# Patient Record
Sex: FEMALE | Race: OTHER | NOT HISPANIC OR LATINO | Employment: FULL TIME | URBAN - METROPOLITAN AREA
[De-identification: names, ages, dates, MRNs, and addresses within clinical notes are randomized per-mention and may not be internally consistent; named-entity substitution may affect disease eponyms.]

---

## 2023-08-09 ENCOUNTER — APPOINTMENT (OUTPATIENT)
Dept: RADIOLOGY | Facility: MEDICAL CENTER | Age: 53
End: 2023-08-09
Attending: EMERGENCY MEDICINE
Payer: MEDICAID

## 2023-08-09 ENCOUNTER — HOSPITAL ENCOUNTER (EMERGENCY)
Facility: MEDICAL CENTER | Age: 53
End: 2023-08-09
Attending: EMERGENCY MEDICINE
Payer: MEDICAID

## 2023-08-09 ENCOUNTER — APPOINTMENT (OUTPATIENT)
Dept: RADIOLOGY | Facility: MEDICAL CENTER | Age: 53
End: 2023-08-09
Payer: MEDICAID

## 2023-08-09 VITALS
BODY MASS INDEX: 20.57 KG/M2 | DIASTOLIC BLOOD PRESSURE: 75 MMHG | SYSTOLIC BLOOD PRESSURE: 131 MMHG | WEIGHT: 128 LBS | OXYGEN SATURATION: 97 % | HEART RATE: 57 BPM | HEIGHT: 66 IN | RESPIRATION RATE: 16 BRPM | TEMPERATURE: 97.4 F

## 2023-08-09 DIAGNOSIS — J44.1 ACUTE EXACERBATION OF CHRONIC OBSTRUCTIVE PULMONARY DISEASE (COPD) (HCC): ICD-10-CM
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
## 2023-08-09 LAB
ALBUMIN SERPL BCP-MCNC: 4.1 G/DL (ref 3.2–4.9)
ALBUMIN/GLOB SERPL: 1.4 G/DL
ALP SERPL-CCNC: 101 U/L (ref 30–99)
ALT SERPL-CCNC: 22 U/L (ref 2–50)
ANION GAP SERPL CALC-SCNC: 11 MMOL/L (ref 7–16)
AST SERPL-CCNC: 22 U/L (ref 12–45)
BILIRUB SERPL-MCNC: 0.2 MG/DL (ref 0.1–1.5)
BUN SERPL-MCNC: 19 MG/DL (ref 8–22)
CALCIUM ALBUM COR SERPL-MCNC: 8.6 MG/DL (ref 8.5–10.5)
CALCIUM SERPL-MCNC: 8.7 MG/DL (ref 8.5–10.5)
CHLORIDE SERPL-SCNC: 106 MMOL/L (ref 96–112)
CO2 SERPL-SCNC: 21 MMOL/L (ref 20–33)
CREAT SERPL-MCNC: 0.75 MG/DL (ref 0.5–1.4)
ERYTHROCYTE [DISTWIDTH] IN BLOOD BY AUTOMATED COUNT: 47.6 FL (ref 35.9–50)
ETHANOL BLD-MCNC: <10.1 MG/DL
GFR SERPLBLD CREATININE-BSD FMLA CKD-EPI: 96 ML/MIN/1.73 M 2
GLOBULIN SER CALC-MCNC: 3 G/DL (ref 1.9–3.5)
GLUCOSE SERPL-MCNC: 97 MG/DL (ref 65–99)
HCG SERPL QL: NEGATIVE
HCT VFR BLD AUTO: 42.1 % (ref 37–47)
HGB BLD-MCNC: 13.9 G/DL (ref 12–16)
MCH RBC QN AUTO: 29.8 PG (ref 27–33)
MCHC RBC AUTO-ENTMCNC: 33 G/DL (ref 32.2–35.5)
MCV RBC AUTO: 90.1 FL (ref 81.4–97.8)
PLATELET # BLD AUTO: 256 K/UL (ref 164–446)
PMV BLD AUTO: 10.3 FL (ref 9–12.9)
POTASSIUM SERPL-SCNC: 4.2 MMOL/L (ref 3.6–5.5)
PROT SERPL-MCNC: 7.1 G/DL (ref 6–8.2)
RBC # BLD AUTO: 4.67 M/UL (ref 4.2–5.4)
SODIUM SERPL-SCNC: 138 MMOL/L (ref 135–145)
WBC # BLD AUTO: 6.8 K/UL (ref 4.8–10.8)

## 2023-08-09 PROCEDURE — 72128 CT CHEST SPINE W/O DYE: CPT

## 2023-08-09 PROCEDURE — A9270 NON-COVERED ITEM OR SERVICE: HCPCS | Performed by: EMERGENCY MEDICINE

## 2023-08-09 PROCEDURE — 80053 COMPREHEN METABOLIC PANEL: CPT

## 2023-08-09 PROCEDURE — 84703 CHORIONIC GONADOTROPIN ASSAY: CPT

## 2023-08-09 PROCEDURE — 82077 ASSAY SPEC XCP UR&BREATH IA: CPT

## 2023-08-09 PROCEDURE — 700117 HCHG RX CONTRAST REV CODE 255: Performed by: EMERGENCY MEDICINE

## 2023-08-09 PROCEDURE — 72131 CT LUMBAR SPINE W/O DYE: CPT

## 2023-08-09 PROCEDURE — 700101 HCHG RX REV CODE 250: Performed by: EMERGENCY MEDICINE

## 2023-08-09 PROCEDURE — 94640 AIRWAY INHALATION TREATMENT: CPT

## 2023-08-09 PROCEDURE — 85027 COMPLETE CBC AUTOMATED: CPT

## 2023-08-09 PROCEDURE — 99285 EMERGENCY DEPT VISIT HI MDM: CPT

## 2023-08-09 PROCEDURE — 71260 CT THORAX DX C+: CPT

## 2023-08-09 PROCEDURE — 700102 HCHG RX REV CODE 250 W/ 637 OVERRIDE(OP): Performed by: EMERGENCY MEDICINE

## 2023-08-09 PROCEDURE — 305948 HCHG GREEN TRAUMA ACT PRE-NOTIFY NO CC

## 2023-08-09 PROCEDURE — 72170 X-RAY EXAM OF PELVIS: CPT

## 2023-08-09 PROCEDURE — 700111 HCHG RX REV CODE 636 W/ 250 OVERRIDE (IP): Performed by: EMERGENCY MEDICINE

## 2023-08-09 PROCEDURE — 36415 COLL VENOUS BLD VENIPUNCTURE: CPT

## 2023-08-09 RX ORDER — PREDNISONE 20 MG/1
20 TABLET ORAL DAILY
Qty: 4 TABLET | Refills: 0 | Status: SHIPPED | OUTPATIENT
Start: 2023-08-09 | End: 2023-08-13

## 2023-08-09 RX ORDER — PREDNISONE 20 MG/1
20 TABLET ORAL DAILY
Qty: 4 TABLET | Refills: 0 | Status: SHIPPED | OUTPATIENT
Start: 2023-08-09 | End: 2023-08-09 | Stop reason: SDUPTHER

## 2023-08-09 RX ORDER — IPRATROPIUM BROMIDE AND ALBUTEROL SULFATE 2.5; .5 MG/3ML; MG/3ML
3 SOLUTION RESPIRATORY (INHALATION) ONCE
Status: COMPLETED | OUTPATIENT
Start: 2023-08-09 | End: 2023-08-09

## 2023-08-09 RX ORDER — PREDNISONE 20 MG/1
60 TABLET ORAL ONCE
Status: COMPLETED | OUTPATIENT
Start: 2023-08-09 | End: 2023-08-09

## 2023-08-09 RX ORDER — OXYCODONE HYDROCHLORIDE AND ACETAMINOPHEN 5; 325 MG/1; MG/1
1 TABLET ORAL ONCE
Status: COMPLETED | OUTPATIENT
Start: 2023-08-09 | End: 2023-08-09

## 2023-08-09 RX ADMIN — IPRATROPIUM BROMIDE AND ALBUTEROL SULFATE 3 ML: 2.5; .5 SOLUTION RESPIRATORY (INHALATION) at 10:00

## 2023-08-09 RX ADMIN — OXYCODONE AND ACETAMINOPHEN 1 TABLET: 5; 325 TABLET ORAL at 08:14

## 2023-08-09 RX ADMIN — IOHEXOL 100 ML: 350 INJECTION, SOLUTION INTRAVENOUS at 08:09

## 2023-08-09 RX ADMIN — PREDNISONE 60 MG: 20 TABLET ORAL at 09:55

## 2023-08-09 NOTE — DISCHARGE PLANNING
ER  Note:  Received call from pt ([phone]) requesting for a work note. Informed pt that work note will be at the ED lobby. Pt also reports she did not get her printed prescription for prednisone. RN CM spoke to ERP. Prednisone ordered by Dr. Fabian. Work note and prescription left at ED lobby for pt to . ED tech informed. RN CM called back pt and provided above update.
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
## 2024-12-20 ENCOUNTER — APPOINTMENT (OUTPATIENT)
Dept: RADIOLOGY | Facility: MEDICAL CENTER | Age: 54
End: 2024-12-20
Attending: EMERGENCY MEDICINE
Payer: MEDICAID

## 2024-12-20 ENCOUNTER — PHARMACY VISIT (OUTPATIENT)
Dept: PHARMACY | Facility: MEDICAL CENTER | Age: 54
End: 2024-12-20
Payer: COMMERCIAL

## 2024-12-20 ENCOUNTER — HOSPITAL ENCOUNTER (EMERGENCY)
Facility: MEDICAL CENTER | Age: 54
End: 2024-12-20
Attending: EMERGENCY MEDICINE
Payer: MEDICAID

## 2024-12-20 VITALS
RESPIRATION RATE: 18 BRPM | OXYGEN SATURATION: 95 % | WEIGHT: 128.09 LBS | TEMPERATURE: 98.4 F | BODY MASS INDEX: 20.59 KG/M2 | HEIGHT: 66 IN | DIASTOLIC BLOOD PRESSURE: 67 MMHG | SYSTOLIC BLOOD PRESSURE: 112 MMHG | HEART RATE: 92 BPM

## 2024-12-20 DIAGNOSIS — J44.1 COPD EXACERBATION (HCC): ICD-10-CM

## 2024-12-20 LAB
ALBUMIN SERPL BCP-MCNC: 3.8 G/DL (ref 3.2–4.9)
ALBUMIN/GLOB SERPL: 1.2 G/DL
ALP SERPL-CCNC: 96 U/L (ref 30–99)
ALT SERPL-CCNC: 13 U/L (ref 2–50)
ANION GAP SERPL CALC-SCNC: 12 MMOL/L (ref 7–16)
AST SERPL-CCNC: 13 U/L (ref 12–45)
BASOPHILS # BLD AUTO: 1.1 % (ref 0–1.8)
BASOPHILS # BLD: 0.07 K/UL (ref 0–0.12)
BILIRUB SERPL-MCNC: 0.2 MG/DL (ref 0.1–1.5)
BUN SERPL-MCNC: 14 MG/DL (ref 8–22)
CALCIUM ALBUM COR SERPL-MCNC: 8.7 MG/DL (ref 8.5–10.5)
CALCIUM SERPL-MCNC: 8.5 MG/DL (ref 8.5–10.5)
CHLORIDE SERPL-SCNC: 103 MMOL/L (ref 96–112)
CO2 SERPL-SCNC: 24 MMOL/L (ref 20–33)
CREAT SERPL-MCNC: 0.64 MG/DL (ref 0.5–1.4)
EKG IMPRESSION: NORMAL
EOSINOPHIL # BLD AUTO: 0.64 K/UL (ref 0–0.51)
EOSINOPHIL NFR BLD: 9.9 % (ref 0–6.9)
ERYTHROCYTE [DISTWIDTH] IN BLOOD BY AUTOMATED COUNT: 49.3 FL (ref 35.9–50)
GFR SERPLBLD CREATININE-BSD FMLA CKD-EPI: 105 ML/MIN/1.73 M 2
GLOBULIN SER CALC-MCNC: 3.1 G/DL (ref 1.9–3.5)
GLUCOSE SERPL-MCNC: 98 MG/DL (ref 65–99)
HCT VFR BLD AUTO: 42.8 % (ref 37–47)
HGB BLD-MCNC: 13.7 G/DL (ref 12–16)
IMM GRANULOCYTES # BLD AUTO: 0.01 K/UL (ref 0–0.11)
IMM GRANULOCYTES NFR BLD AUTO: 0.2 % (ref 0–0.9)
LYMPHOCYTES # BLD AUTO: 1.62 K/UL (ref 1–4.8)
LYMPHOCYTES NFR BLD: 25.2 % (ref 22–41)
MCH RBC QN AUTO: 29.1 PG (ref 27–33)
MCHC RBC AUTO-ENTMCNC: 32 G/DL (ref 32.2–35.5)
MCV RBC AUTO: 90.9 FL (ref 81.4–97.8)
MONOCYTES # BLD AUTO: 0.48 K/UL (ref 0–0.85)
MONOCYTES NFR BLD AUTO: 7.5 % (ref 0–13.4)
NEUTROPHILS # BLD AUTO: 3.62 K/UL (ref 1.82–7.42)
NEUTROPHILS NFR BLD: 56.1 % (ref 44–72)
NRBC # BLD AUTO: 0 K/UL
NRBC BLD-RTO: 0 /100 WBC (ref 0–0.2)
NT-PROBNP SERPL IA-MCNC: 240 PG/ML (ref 0–125)
PLATELET # BLD AUTO: 334 K/UL (ref 164–446)
PMV BLD AUTO: 10.2 FL (ref 9–12.9)
POTASSIUM SERPL-SCNC: 3.7 MMOL/L (ref 3.6–5.5)
PROT SERPL-MCNC: 6.9 G/DL (ref 6–8.2)
RBC # BLD AUTO: 4.71 M/UL (ref 4.2–5.4)
SODIUM SERPL-SCNC: 139 MMOL/L (ref 135–145)
TROPONIN T SERPL-MCNC: <6 NG/L (ref 6–19)
WBC # BLD AUTO: 6.4 K/UL (ref 4.8–10.8)

## 2024-12-20 PROCEDURE — 83880 ASSAY OF NATRIURETIC PEPTIDE: CPT

## 2024-12-20 PROCEDURE — 71045 X-RAY EXAM CHEST 1 VIEW: CPT

## 2024-12-20 PROCEDURE — 36415 COLL VENOUS BLD VENIPUNCTURE: CPT

## 2024-12-20 PROCEDURE — 700101 HCHG RX REV CODE 250: Mod: UD | Performed by: EMERGENCY MEDICINE

## 2024-12-20 PROCEDURE — 93005 ELECTROCARDIOGRAM TRACING: CPT | Mod: TC

## 2024-12-20 PROCEDURE — 84484 ASSAY OF TROPONIN QUANT: CPT

## 2024-12-20 PROCEDURE — 80053 COMPREHEN METABOLIC PANEL: CPT

## 2024-12-20 PROCEDURE — 85025 COMPLETE CBC W/AUTO DIFF WBC: CPT

## 2024-12-20 PROCEDURE — 700111 HCHG RX REV CODE 636 W/ 250 OVERRIDE (IP): Mod: JZ,UD | Performed by: EMERGENCY MEDICINE

## 2024-12-20 PROCEDURE — 94640 AIRWAY INHALATION TREATMENT: CPT

## 2024-12-20 PROCEDURE — 93005 ELECTROCARDIOGRAM TRACING: CPT | Mod: TC | Performed by: EMERGENCY MEDICINE

## 2024-12-20 PROCEDURE — 99285 EMERGENCY DEPT VISIT HI MDM: CPT

## 2024-12-20 PROCEDURE — 96374 THER/PROPH/DIAG INJ IV PUSH: CPT

## 2024-12-20 RX ORDER — METHYLPREDNISOLONE SODIUM SUCCINATE 125 MG/2ML
62.5 INJECTION, POWDER, LYOPHILIZED, FOR SOLUTION INTRAMUSCULAR; INTRAVENOUS ONCE
Status: COMPLETED | OUTPATIENT
Start: 2024-12-20 | End: 2024-12-20

## 2024-12-20 RX ORDER — ALBUTEROL SULFATE 0.83 MG/ML
2.5 SOLUTION RESPIRATORY (INHALATION) EVERY 4 HOURS PRN
Qty: 75 ML | Refills: 0 | Status: SHIPPED | OUTPATIENT
Start: 2024-12-20

## 2024-12-20 RX ORDER — ALBUTEROL SULFATE 90 UG/1
2 INHALANT RESPIRATORY (INHALATION) EVERY 4 HOURS PRN
Qty: 8.5 G | Refills: 0 | Status: SHIPPED | OUTPATIENT
Start: 2024-12-20

## 2024-12-20 RX ORDER — IPRATROPIUM BROMIDE AND ALBUTEROL SULFATE 2.5; .5 MG/3ML; MG/3ML
3 SOLUTION RESPIRATORY (INHALATION)
Status: DISCONTINUED | OUTPATIENT
Start: 2024-12-20 | End: 2024-12-20 | Stop reason: HOSPADM

## 2024-12-20 RX ORDER — PREDNISONE 20 MG/1
40 TABLET ORAL DAILY
Qty: 10 TABLET | Refills: 0 | Status: SHIPPED | OUTPATIENT
Start: 2024-12-20 | End: 2024-12-25

## 2024-12-20 RX ADMIN — IPRATROPIUM BROMIDE AND ALBUTEROL SULFATE 3 ML: .5; 2.5 SOLUTION RESPIRATORY (INHALATION) at 09:47

## 2024-12-20 RX ADMIN — IPRATROPIUM BROMIDE AND ALBUTEROL SULFATE 3 ML: .5; 2.5 SOLUTION RESPIRATORY (INHALATION) at 06:48

## 2024-12-20 RX ADMIN — METHYLPREDNISOLONE SODIUM SUCCINATE 62.5 MG: 125 INJECTION, POWDER, FOR SOLUTION INTRAMUSCULAR; INTRAVENOUS at 05:32

## 2024-12-20 ASSESSMENT — FIBROSIS 4 INDEX: FIB4 SCORE: 0.97
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
## 2024-12-20 NOTE — ED TRIAGE NOTES
"54 y.o. female [facility]  Chief Complaint   Patient presents with    Shortness of Breath     Patient BIB EMS from home to [facility] presented to ED with complaints of SOB, patient endorses she was in bed whole day because she was not feeling well, last night her SOB got worse, pt wears PRN o2 as baseline, As per EMS Patient had wheezes and was in moderate distress on scene, on which they nebulized patient.      Patient AAO X4 , Respirations even, with mild work of breathing, patient is in tripod position, patient able to speak full sentences, hypoxic on room air with spo2 87% placed on 2 liters O2, afebrile at this time.     Medication en route   Albuterol x 2  Duo neb x 1    ED RN protocol initiated for SOB       /65   Pulse 76   Resp 18   Ht 1.676 m (5' 5.98\")   Wt 58.1 kg (128 lb 1.4 oz)   LMP  (LMP Unknown)   SpO2 (!) 87%   BMI 20.68 kg/m²     Allergies   Allergen Reactions    Naproxen Anaphylaxis    Naproxen     Pcn [Penicillins]      Past Medical History:   Diagnosis Date    COPD (chronic obstructive pulmonary disease) (HCC)      Past Surgical History:   Procedure Laterality Date    TONSILLECTOMY AND ADENOIDECTOMY       Pt made aware of triage process, placed back into lobby, educated pt to tell staff of any worsening of symptoms       "

## 2024-12-20 NOTE — ED NOTES
Pt noted to desat to 86% while sitting in gurney, awake and alert.  Pt placed on 2L NC. Pt states she has a concentrator at home but is out of portable O2 tanks.  ERP and SW updated.  SW brought to bedside to speak with pt. Pt unsure what the name of her oxygen company is.  SW offered to called oxygen companies to figure out which one covers pt's oxygen. Pt declined but is agreeable to go home with medical transport and states she will call her oxygen company when she gets home to order more portable tanks.

## 2024-12-20 NOTE — ED NOTES
Bedside report received from off going RN/tech: Wendy, assumed care of patient.  POC discussed with patient. Call light within reach, all needs addressed at this time.       Fall risk interventions in place: Keep floor surfaces clean and dry (all applicable per [place] Fall risk assessment)   Continuous monitoring: Pulse Ox or Blood Pressure  IVF/IV medications: Not Applicable   Oxygen: How many liters 2L  Bedside sitter: Not Applicable   Isolation: Not Applicable

## 2024-12-20 NOTE — DISCHARGE PLANNING
MSW met with pt to discuss oxygen tanks. Pt stated she has a working concentrator at home but does not know the company who services her tanks and concentrator. Pt stated she is ok going home with medical transport and then calling the company when she gets home for additional tanks. MSW called Roya at [facility] for transport. Roya stated [facility] cannot accommodate ride till Monday. MSW arranged [facility] transport home with 2L O2 home for pt. . Cost $111. MSW updated bedside RN and ERP.

## 2024-12-20 NOTE — ED NOTES
Patient bedside report given to RN Alina . Pt AAO X 4 , respirations even and unlabored, on 2 liters O2 via nasal canula . Call light in reach, Fall risk interventions in place.

## 2024-12-20 NOTE — ED NOTES
Sister called requesting for patient plan of care, verbal consent obtained from patient, updated on plan of care,

## 2024-12-20 NOTE — ED NOTES
Bedside report received from off going RN/tech: Wendy, assumed care of patient.  POC discussed with patient. Call light within reach, all needs addressed at this time.       Fall risk interventions in place: Keep floor surfaces clean and dry (all applicable per [place] Fall risk assessment)   Continuous monitoring: Pulse Ox or Blood Pressure  IVF/IV medications: Not Applicable   Oxygen: How many liters 2L  Bedside sitter: Not Applicable   Isolation: Not Applicable

## 2024-12-20 NOTE — ED NOTES
Patient's daughter called [facility] [phone], requesting to connect the call with patient.   Patient provided with hospital phone and connected to sister Hunter

## 2024-12-20 NOTE — ED NOTES
Sister called and requesting update on patient conditioned, updated after patient's verbal consent.

## 2025-02-15 ENCOUNTER — APPOINTMENT (OUTPATIENT)
Dept: RADIOLOGY | Facility: MEDICAL CENTER | Age: 55
DRG: 190 | End: 2025-02-15
Attending: STUDENT IN AN ORGANIZED HEALTH CARE EDUCATION/TRAINING PROGRAM
Payer: MEDICAID

## 2025-02-15 ENCOUNTER — HOSPITAL ENCOUNTER (INPATIENT)
Facility: MEDICAL CENTER | Age: 55
LOS: 3 days | DRG: 190 | End: 2025-02-18
Attending: STUDENT IN AN ORGANIZED HEALTH CARE EDUCATION/TRAINING PROGRAM | Admitting: STUDENT IN AN ORGANIZED HEALTH CARE EDUCATION/TRAINING PROGRAM
Payer: MEDICAID

## 2025-02-15 DIAGNOSIS — J44.1 ACUTE EXACERBATION OF CHRONIC OBSTRUCTIVE PULMONARY DISEASE (COPD) (HCC): ICD-10-CM

## 2025-02-15 DIAGNOSIS — J96.01 ACUTE HYPOXIC RESPIRATORY FAILURE (HCC): ICD-10-CM

## 2025-02-15 PROBLEM — J20.9 ACUTE BRONCHITIS WITH CHRONIC OBSTRUCTIVE PULMONARY DISEASE (COPD) (HCC): Status: ACTIVE | Noted: 2025-02-15

## 2025-02-15 PROBLEM — J44.0 ACUTE BRONCHITIS WITH CHRONIC OBSTRUCTIVE PULMONARY DISEASE (COPD) (HCC): Status: ACTIVE | Noted: 2025-02-15

## 2025-02-15 LAB
ALBUMIN SERPL BCP-MCNC: 4.2 G/DL (ref 3.2–4.9)
ALBUMIN/GLOB SERPL: 1.2 G/DL
ALP SERPL-CCNC: 111 U/L (ref 30–99)
ALT SERPL-CCNC: 18 U/L (ref 2–50)
ANION GAP SERPL CALC-SCNC: 12 MMOL/L (ref 7–16)
APPEARANCE UR: CLEAR
AST SERPL-CCNC: 18 U/L (ref 12–45)
BACTERIA #/AREA URNS HPF: ABNORMAL /HPF
BASOPHILS # BLD AUTO: 1.5 % (ref 0–1.8)
BASOPHILS # BLD: 0.1 K/UL (ref 0–0.12)
BILIRUB SERPL-MCNC: 0.2 MG/DL (ref 0.1–1.5)
BILIRUB UR QL STRIP.AUTO: NEGATIVE
BUN SERPL-MCNC: 16 MG/DL (ref 8–22)
CALCIUM ALBUM COR SERPL-MCNC: 9.3 MG/DL (ref 8.5–10.5)
CALCIUM SERPL-MCNC: 9.5 MG/DL (ref 8.5–10.5)
CASTS URNS QL MICRO: ABNORMAL /LPF (ref 0–2)
CHLORIDE SERPL-SCNC: 107 MMOL/L (ref 96–112)
CO2 SERPL-SCNC: 22 MMOL/L (ref 20–33)
COLOR UR: YELLOW
CORTIS SERPL-MCNC: 22.1 UG/DL (ref 0–23)
CREAT SERPL-MCNC: 0.84 MG/DL (ref 0.5–1.4)
CRP SERPL HS-MCNC: <0.3 MG/DL (ref 0–0.75)
EKG IMPRESSION: NORMAL
EOSINOPHIL # BLD AUTO: 0.73 K/UL (ref 0–0.51)
EOSINOPHIL NFR BLD: 11.1 % (ref 0–6.9)
EPITHELIAL CELLS 1715: ABNORMAL /HPF (ref 0–5)
ERYTHROCYTE [DISTWIDTH] IN BLOOD BY AUTOMATED COUNT: 48.2 FL (ref 35.9–50)
FLUAV RNA SPEC QL NAA+PROBE: NEGATIVE
FLUBV RNA SPEC QL NAA+PROBE: NEGATIVE
GFR SERPLBLD CREATININE-BSD FMLA CKD-EPI: 83 ML/MIN/1.73 M 2
GLOBULIN SER CALC-MCNC: 3.6 G/DL (ref 1.9–3.5)
GLUCOSE SERPL-MCNC: 83 MG/DL (ref 65–99)
GLUCOSE UR STRIP.AUTO-MCNC: NEGATIVE MG/DL
HCT VFR BLD AUTO: 48.9 % (ref 37–47)
HGB BLD-MCNC: 16.1 G/DL (ref 12–16)
IMM GRANULOCYTES # BLD AUTO: 0.01 K/UL (ref 0–0.11)
IMM GRANULOCYTES NFR BLD AUTO: 0.2 % (ref 0–0.9)
KETONES UR STRIP.AUTO-MCNC: NEGATIVE MG/DL
LACTATE SERPL-SCNC: 2 MMOL/L (ref 0.5–2)
LEUKOCYTE ESTERASE UR QL STRIP.AUTO: ABNORMAL
LYMPHOCYTES # BLD AUTO: 2.88 K/UL (ref 1–4.8)
LYMPHOCYTES NFR BLD: 43.8 % (ref 22–41)
MCH RBC QN AUTO: 29.4 PG (ref 27–33)
MCHC RBC AUTO-ENTMCNC: 32.9 G/DL (ref 32.2–35.5)
MCV RBC AUTO: 89.4 FL (ref 81.4–97.8)
MICRO URNS: ABNORMAL
MONOCYTES # BLD AUTO: 0.51 K/UL (ref 0–0.85)
MONOCYTES NFR BLD AUTO: 7.8 % (ref 0–13.4)
NEUTROPHILS # BLD AUTO: 2.34 K/UL (ref 1.82–7.42)
NEUTROPHILS NFR BLD: 35.6 % (ref 44–72)
NITRITE UR QL STRIP.AUTO: NEGATIVE
NRBC # BLD AUTO: 0 K/UL
NRBC BLD-RTO: 0 /100 WBC (ref 0–0.2)
PH UR STRIP.AUTO: 5 [PH] (ref 5–8)
PLATELET # BLD AUTO: 328 K/UL (ref 164–446)
PMV BLD AUTO: 9.9 FL (ref 9–12.9)
POTASSIUM SERPL-SCNC: 4.6 MMOL/L (ref 3.6–5.5)
PROCALCITONIN SERPL-MCNC: 0.05 NG/ML
PROT SERPL-MCNC: 7.8 G/DL (ref 6–8.2)
PROT UR QL STRIP: NEGATIVE MG/DL
RBC # BLD AUTO: 5.47 M/UL (ref 4.2–5.4)
RBC # URNS HPF: ABNORMAL /HPF (ref 0–2)
RBC UR QL AUTO: NEGATIVE
RSV RNA SPEC QL NAA+PROBE: NEGATIVE
SARS-COV-2 RNA RESP QL NAA+PROBE: NOTDETECTED
SODIUM SERPL-SCNC: 141 MMOL/L (ref 135–145)
SP GR UR STRIP.AUTO: 1.01
UROBILINOGEN UR STRIP.AUTO-MCNC: 0.2 EU/DL
WBC # BLD AUTO: 6.6 K/UL (ref 4.8–10.8)
WBC #/AREA URNS HPF: ABNORMAL /HPF

## 2025-02-15 PROCEDURE — 770020 HCHG ROOM/CARE - TELE (206)

## 2025-02-15 PROCEDURE — 94660 CPAP INITIATION&MGMT: CPT

## 2025-02-15 PROCEDURE — 700102 HCHG RX REV CODE 250 W/ 637 OVERRIDE(OP): Mod: UD | Performed by: STUDENT IN AN ORGANIZED HEALTH CARE EDUCATION/TRAINING PROGRAM

## 2025-02-15 PROCEDURE — 0241U HCHG SARS-COV-2 COVID-19 NFCT DS RESP RNA 4 TRGT ED POC: CPT

## 2025-02-15 PROCEDURE — 96375 TX/PRO/DX INJ NEW DRUG ADDON: CPT

## 2025-02-15 PROCEDURE — 700105 HCHG RX REV CODE 258: Mod: UD | Performed by: STUDENT IN AN ORGANIZED HEALTH CARE EDUCATION/TRAINING PROGRAM

## 2025-02-15 PROCEDURE — 85652 RBC SED RATE AUTOMATED: CPT

## 2025-02-15 PROCEDURE — 93005 ELECTROCARDIOGRAM TRACING: CPT | Mod: TC | Performed by: STUDENT IN AN ORGANIZED HEALTH CARE EDUCATION/TRAINING PROGRAM

## 2025-02-15 PROCEDURE — 96365 THER/PROPH/DIAG IV INF INIT: CPT

## 2025-02-15 PROCEDURE — 71045 X-RAY EXAM CHEST 1 VIEW: CPT

## 2025-02-15 PROCEDURE — 80053 COMPREHEN METABOLIC PANEL: CPT

## 2025-02-15 PROCEDURE — 83605 ASSAY OF LACTIC ACID: CPT

## 2025-02-15 PROCEDURE — 80307 DRUG TEST PRSMV CHEM ANLYZR: CPT

## 2025-02-15 PROCEDURE — 700111 HCHG RX REV CODE 636 W/ 250 OVERRIDE (IP): Mod: JZ,UD | Performed by: STUDENT IN AN ORGANIZED HEALTH CARE EDUCATION/TRAINING PROGRAM

## 2025-02-15 PROCEDURE — 99285 EMERGENCY DEPT VISIT HI MDM: CPT

## 2025-02-15 PROCEDURE — 94640 AIRWAY INHALATION TREATMENT: CPT

## 2025-02-15 PROCEDURE — 96366 THER/PROPH/DIAG IV INF ADDON: CPT

## 2025-02-15 PROCEDURE — A9270 NON-COVERED ITEM OR SERVICE: HCPCS | Mod: UD | Performed by: STUDENT IN AN ORGANIZED HEALTH CARE EDUCATION/TRAINING PROGRAM

## 2025-02-15 PROCEDURE — 81001 URINALYSIS AUTO W/SCOPE: CPT

## 2025-02-15 PROCEDURE — 85025 COMPLETE CBC W/AUTO DIFF WBC: CPT

## 2025-02-15 PROCEDURE — 84145 PROCALCITONIN (PCT): CPT

## 2025-02-15 PROCEDURE — 36415 COLL VENOUS BLD VENIPUNCTURE: CPT

## 2025-02-15 PROCEDURE — 87040 BLOOD CULTURE FOR BACTERIA: CPT

## 2025-02-15 PROCEDURE — 99291 CRITICAL CARE FIRST HOUR: CPT | Performed by: STUDENT IN AN ORGANIZED HEALTH CARE EDUCATION/TRAINING PROGRAM

## 2025-02-15 PROCEDURE — 82533 TOTAL CORTISOL: CPT

## 2025-02-15 PROCEDURE — 85610 PROTHROMBIN TIME: CPT

## 2025-02-15 PROCEDURE — 94644 CONT INHLJ TX 1ST HOUR: CPT

## 2025-02-15 PROCEDURE — 87086 URINE CULTURE/COLONY COUNT: CPT

## 2025-02-15 PROCEDURE — 86140 C-REACTIVE PROTEIN: CPT

## 2025-02-15 PROCEDURE — 700101 HCHG RX REV CODE 250: Mod: UD | Performed by: STUDENT IN AN ORGANIZED HEALTH CARE EDUCATION/TRAINING PROGRAM

## 2025-02-15 RX ORDER — BENZONATATE 100 MG/1
100 CAPSULE ORAL 3 TIMES DAILY PRN
Status: DISCONTINUED | OUTPATIENT
Start: 2025-02-15 | End: 2025-02-18 | Stop reason: HOSPADM

## 2025-02-15 RX ORDER — ACETAMINOPHEN 325 MG/1
650 TABLET ORAL EVERY 6 HOURS PRN
Status: DISCONTINUED | OUTPATIENT
Start: 2025-02-15 | End: 2025-02-18 | Stop reason: HOSPADM

## 2025-02-15 RX ORDER — MIDAZOLAM HYDROCHLORIDE 1 MG/ML
1 INJECTION INTRAMUSCULAR; INTRAVENOUS ONCE
Status: COMPLETED | OUTPATIENT
Start: 2025-02-15 | End: 2025-02-15

## 2025-02-15 RX ORDER — SODIUM CHLORIDE, SODIUM LACTATE, POTASSIUM CHLORIDE, AND CALCIUM CHLORIDE .6; .31; .03; .02 G/100ML; G/100ML; G/100ML; G/100ML
500 INJECTION, SOLUTION INTRAVENOUS
Status: DISCONTINUED | OUTPATIENT
Start: 2025-02-15 | End: 2025-02-18 | Stop reason: HOSPADM

## 2025-02-15 RX ORDER — IPRATROPIUM BROMIDE AND ALBUTEROL SULFATE 2.5; .5 MG/3ML; MG/3ML
3 SOLUTION RESPIRATORY (INHALATION) ONCE
Status: COMPLETED | OUTPATIENT
Start: 2025-02-15 | End: 2025-02-15

## 2025-02-15 RX ORDER — ENOXAPARIN SODIUM 100 MG/ML
40 INJECTION SUBCUTANEOUS DAILY
Status: DISCONTINUED | OUTPATIENT
Start: 2025-02-16 | End: 2025-02-18 | Stop reason: HOSPADM

## 2025-02-15 RX ORDER — SODIUM CHLORIDE, SODIUM LACTATE, POTASSIUM CHLORIDE, CALCIUM CHLORIDE 600; 310; 30; 20 MG/100ML; MG/100ML; MG/100ML; MG/100ML
743 INJECTION, SOLUTION INTRAVENOUS ONCE
Status: COMPLETED | OUTPATIENT
Start: 2025-02-15 | End: 2025-02-15

## 2025-02-15 RX ORDER — GUAIFENESIN/DEXTROMETHORPHAN 100-10MG/5
10 SYRUP ORAL EVERY 6 HOURS PRN
Status: DISCONTINUED | OUTPATIENT
Start: 2025-02-15 | End: 2025-02-18 | Stop reason: HOSPADM

## 2025-02-15 RX ORDER — IPRATROPIUM BROMIDE AND ALBUTEROL SULFATE 2.5; .5 MG/3ML; MG/3ML
3 SOLUTION RESPIRATORY (INHALATION)
Status: DISCONTINUED | OUTPATIENT
Start: 2025-02-15 | End: 2025-02-17

## 2025-02-15 RX ORDER — POLYETHYLENE GLYCOL 3350 17 G/17G
1 POWDER, FOR SOLUTION ORAL
Status: DISCONTINUED | OUTPATIENT
Start: 2025-02-15 | End: 2025-02-18 | Stop reason: HOSPADM

## 2025-02-15 RX ORDER — AZITHROMYCIN 250 MG/1
500 TABLET, FILM COATED ORAL DAILY
Status: COMPLETED | OUTPATIENT
Start: 2025-02-16 | End: 2025-02-17

## 2025-02-15 RX ORDER — PROMETHAZINE HYDROCHLORIDE 25 MG/1
12.5-25 SUPPOSITORY RECTAL EVERY 4 HOURS PRN
Status: DISCONTINUED | OUTPATIENT
Start: 2025-02-15 | End: 2025-02-18 | Stop reason: HOSPADM

## 2025-02-15 RX ORDER — CEFTRIAXONE 2 G/1
2000 INJECTION, POWDER, FOR SOLUTION INTRAMUSCULAR; INTRAVENOUS ONCE
Status: COMPLETED | OUTPATIENT
Start: 2025-02-15 | End: 2025-02-15

## 2025-02-15 RX ORDER — ONDANSETRON 2 MG/ML
4 INJECTION INTRAMUSCULAR; INTRAVENOUS EVERY 4 HOURS PRN
Status: DISCONTINUED | OUTPATIENT
Start: 2025-02-15 | End: 2025-02-18 | Stop reason: HOSPADM

## 2025-02-15 RX ORDER — LABETALOL HYDROCHLORIDE 5 MG/ML
10 INJECTION, SOLUTION INTRAVENOUS EVERY 4 HOURS PRN
Status: DISCONTINUED | OUTPATIENT
Start: 2025-02-15 | End: 2025-02-18 | Stop reason: HOSPADM

## 2025-02-15 RX ORDER — MONTELUKAST SODIUM 10 MG/1
10 TABLET ORAL NIGHTLY
Status: DISCONTINUED | OUTPATIENT
Start: 2025-02-15 | End: 2025-02-18 | Stop reason: HOSPADM

## 2025-02-15 RX ORDER — NICOTINE 21 MG/24HR
21 PATCH, TRANSDERMAL 24 HOURS TRANSDERMAL
Status: DISCONTINUED | OUTPATIENT
Start: 2025-02-16 | End: 2025-02-15

## 2025-02-15 RX ORDER — METHYLPREDNISOLONE SODIUM SUCCINATE 40 MG/ML
40 INJECTION, POWDER, LYOPHILIZED, FOR SOLUTION INTRAMUSCULAR; INTRAVENOUS EVERY 8 HOURS
Status: DISCONTINUED | OUTPATIENT
Start: 2025-02-16 | End: 2025-02-18 | Stop reason: HOSPADM

## 2025-02-15 RX ORDER — PROMETHAZINE HYDROCHLORIDE 25 MG/1
12.5-25 TABLET ORAL EVERY 4 HOURS PRN
Status: DISCONTINUED | OUTPATIENT
Start: 2025-02-15 | End: 2025-02-18 | Stop reason: HOSPADM

## 2025-02-15 RX ORDER — ALBUTEROL SULFATE 90 UG/1
2 INHALANT RESPIRATORY (INHALATION) EVERY 4 HOURS PRN
Status: DISCONTINUED | OUTPATIENT
Start: 2025-02-15 | End: 2025-02-18 | Stop reason: HOSPADM

## 2025-02-15 RX ORDER — ONDANSETRON 4 MG/1
4 TABLET, ORALLY DISINTEGRATING ORAL EVERY 4 HOURS PRN
Status: DISCONTINUED | OUTPATIENT
Start: 2025-02-15 | End: 2025-02-18 | Stop reason: HOSPADM

## 2025-02-15 RX ORDER — SODIUM CHLORIDE, SODIUM LACTATE, POTASSIUM CHLORIDE, CALCIUM CHLORIDE 600; 310; 30; 20 MG/100ML; MG/100ML; MG/100ML; MG/100ML
1000 INJECTION, SOLUTION INTRAVENOUS ONCE
Status: COMPLETED | OUTPATIENT
Start: 2025-02-15 | End: 2025-02-15

## 2025-02-15 RX ORDER — AMOXICILLIN 250 MG
2 CAPSULE ORAL EVERY EVENING
Status: DISCONTINUED | OUTPATIENT
Start: 2025-02-16 | End: 2025-02-18 | Stop reason: HOSPADM

## 2025-02-15 RX ORDER — MAGNESIUM SULFATE HEPTAHYDRATE 40 MG/ML
2 INJECTION, SOLUTION INTRAVENOUS ONCE
Status: COMPLETED | OUTPATIENT
Start: 2025-02-15 | End: 2025-02-15

## 2025-02-15 RX ORDER — METHYLPREDNISOLONE SODIUM SUCCINATE 125 MG/2ML
125 INJECTION, POWDER, LYOPHILIZED, FOR SOLUTION INTRAMUSCULAR; INTRAVENOUS ONCE
Status: COMPLETED | OUTPATIENT
Start: 2025-02-15 | End: 2025-02-15

## 2025-02-15 RX ORDER — ALBUTEROL SULFATE 5 MG/ML
2.5 SOLUTION RESPIRATORY (INHALATION)
Status: DISCONTINUED | OUTPATIENT
Start: 2025-02-15 | End: 2025-02-17

## 2025-02-15 RX ORDER — AZITHROMYCIN 250 MG/1
500 TABLET, FILM COATED ORAL ONCE
Status: COMPLETED | OUTPATIENT
Start: 2025-02-15 | End: 2025-02-15

## 2025-02-15 RX ORDER — PROCHLORPERAZINE EDISYLATE 5 MG/ML
5-10 INJECTION INTRAMUSCULAR; INTRAVENOUS EVERY 4 HOURS PRN
Status: DISCONTINUED | OUTPATIENT
Start: 2025-02-15 | End: 2025-02-18 | Stop reason: HOSPADM

## 2025-02-15 RX ADMIN — Medication 15 MG/HR: at 20:22

## 2025-02-15 RX ADMIN — SODIUM CHLORIDE, POTASSIUM CHLORIDE, SODIUM LACTATE AND CALCIUM CHLORIDE 1000 ML: 600; 310; 30; 20 INJECTION, SOLUTION INTRAVENOUS at 20:00

## 2025-02-15 RX ADMIN — IPRATROPIUM BROMIDE AND ALBUTEROL SULFATE 3 ML: .5; 2.5 SOLUTION RESPIRATORY (INHALATION) at 22:03

## 2025-02-15 RX ADMIN — IPRATROPIUM BROMIDE 0.5 MG: 0.5 SOLUTION RESPIRATORY (INHALATION) at 20:22

## 2025-02-15 RX ADMIN — SODIUM CHLORIDE, POTASSIUM CHLORIDE, SODIUM LACTATE AND CALCIUM CHLORIDE 743 ML: 600; 310; 30; 20 INJECTION, SOLUTION INTRAVENOUS at 21:13

## 2025-02-15 RX ADMIN — MAGNESIUM SULFATE HEPTAHYDRATE 2 G: 2 INJECTION, SOLUTION INTRAVENOUS at 20:15

## 2025-02-15 RX ADMIN — MIDAZOLAM HYDROCHLORIDE 1 MG: 1 INJECTION, SOLUTION INTRAMUSCULAR; INTRAVENOUS at 20:30

## 2025-02-15 RX ADMIN — CEFTRIAXONE SODIUM 2000 MG: 2 INJECTION, POWDER, FOR SOLUTION INTRAMUSCULAR; INTRAVENOUS at 20:08

## 2025-02-15 RX ADMIN — METHYLPREDNISOLONE SODIUM SUCCINATE 125 MG: 125 INJECTION, POWDER, FOR SOLUTION INTRAMUSCULAR; INTRAVENOUS at 19:59

## 2025-02-15 RX ADMIN — AZITHROMYCIN DIHYDRATE 500 MG: 250 TABLET ORAL at 20:08

## 2025-02-15 ASSESSMENT — FIBROSIS 4 INDEX: FIB4 SCORE: 0.58

## 2025-02-15 ASSESSMENT — PAIN DESCRIPTION - PAIN TYPE: TYPE: ACUTE PAIN

## 2025-02-15 ASSESSMENT — PULMONARY FUNCTION TESTS: EPAP_CMH2O: 6

## 2025-02-16 ENCOUNTER — APPOINTMENT (OUTPATIENT)
Dept: RADIOLOGY | Facility: MEDICAL CENTER | Age: 55
DRG: 190 | End: 2025-02-16
Payer: MEDICAID

## 2025-02-16 PROBLEM — R82.5 POSITIVE URINE DRUG SCREEN: Status: ACTIVE | Noted: 2025-02-16

## 2025-02-16 PROBLEM — R78.81 BACTEREMIA: Status: ACTIVE | Noted: 2025-02-16

## 2025-02-16 PROBLEM — E44.1 MILD PROTEIN-CALORIE MALNUTRITION (HCC): Status: ACTIVE | Noted: 2025-02-16

## 2025-02-16 PROBLEM — E16.2 HYPOGLYCEMIA: Status: ACTIVE | Noted: 2025-02-16

## 2025-02-16 PROBLEM — Z71.6 TOBACCO ABUSE COUNSELING: Status: ACTIVE | Noted: 2025-02-16

## 2025-02-16 PROBLEM — R10.13 DYSPEPSIA: Status: ACTIVE | Noted: 2025-02-16

## 2025-02-16 PROBLEM — I95.9 HYPOTENSION: Status: ACTIVE | Noted: 2025-02-16

## 2025-02-16 PROBLEM — J96.01 ACUTE RESPIRATORY FAILURE WITH HYPOXIA (HCC): Status: ACTIVE | Noted: 2025-02-16

## 2025-02-16 LAB
ALBUMIN SERPL BCP-MCNC: 3.9 G/DL (ref 3.2–4.9)
AMPHET UR QL SCN: POSITIVE
BARBITURATES UR QL SCN: NEGATIVE
BASOPHILS # BLD AUTO: 0.2 % (ref 0–1.8)
BASOPHILS # BLD: 0.01 K/UL (ref 0–0.12)
BENZODIAZ UR QL SCN: POSITIVE
BUN SERPL-MCNC: 13 MG/DL (ref 8–22)
BZE UR QL SCN: NEGATIVE
CALCIUM ALBUM COR SERPL-MCNC: 9.3 MG/DL (ref 8.5–10.5)
CALCIUM SERPL-MCNC: 9.2 MG/DL (ref 8.5–10.5)
CANNABINOIDS UR QL SCN: POSITIVE
CHLORIDE SERPL-SCNC: 103 MMOL/L (ref 96–112)
CK SERPL-CCNC: 64 U/L (ref 0–154)
CO2 SERPL-SCNC: 20 MMOL/L (ref 20–33)
CREAT SERPL-MCNC: 0.79 MG/DL (ref 0.5–1.4)
EOSINOPHIL # BLD AUTO: 0 K/UL (ref 0–0.51)
EOSINOPHIL NFR BLD: 0 % (ref 0–6.9)
ERYTHROCYTE [DISTWIDTH] IN BLOOD BY AUTOMATED COUNT: 48.4 FL (ref 35.9–50)
ERYTHROCYTE [SEDIMENTATION RATE] IN BLOOD BY WESTERGREN METHOD: 6 MM/HOUR (ref 0–25)
FENTANYL UR QL: NEGATIVE
GFR SERPLBLD CREATININE-BSD FMLA CKD-EPI: 89 ML/MIN/1.73 M 2
GLUCOSE SERPL-MCNC: 167 MG/DL (ref 65–99)
GRAM STN SPEC: NORMAL
HCT VFR BLD AUTO: 43.2 % (ref 37–47)
HGB BLD-MCNC: 14.3 G/DL (ref 12–16)
IMM GRANULOCYTES # BLD AUTO: 0.1 K/UL (ref 0–0.11)
IMM GRANULOCYTES NFR BLD AUTO: 1.9 % (ref 0–0.9)
INR PPP: 1.03 (ref 0.87–1.13)
LYMPHOCYTES # BLD AUTO: 0.47 K/UL (ref 1–4.8)
LYMPHOCYTES NFR BLD: 9 % (ref 22–41)
MAGNESIUM SERPL-MCNC: 1.7 MG/DL (ref 1.5–2.5)
MCH RBC QN AUTO: 29.8 PG (ref 27–33)
MCHC RBC AUTO-ENTMCNC: 33.1 G/DL (ref 32.2–35.5)
MCV RBC AUTO: 90 FL (ref 81.4–97.8)
METHADONE UR QL SCN: NEGATIVE
MONOCYTES # BLD AUTO: 0.05 K/UL (ref 0–0.85)
MONOCYTES NFR BLD AUTO: 1 % (ref 0–13.4)
NEUTROPHILS # BLD AUTO: 4.57 K/UL (ref 1.82–7.42)
NEUTROPHILS NFR BLD: 87.9 % (ref 44–72)
NRBC # BLD AUTO: 0 K/UL
NRBC BLD-RTO: 0 /100 WBC (ref 0–0.2)
NT-PROBNP SERPL IA-MCNC: <36 PG/ML (ref 0–125)
OPIATES UR QL SCN: NEGATIVE
OXYCODONE UR QL SCN: NEGATIVE
PCP UR QL SCN: NEGATIVE
PHOSPHATE SERPL-MCNC: 3.3 MG/DL (ref 2.5–4.5)
PLATELET # BLD AUTO: 259 K/UL (ref 164–446)
PMV BLD AUTO: 9.9 FL (ref 9–12.9)
POTASSIUM SERPL-SCNC: 4.2 MMOL/L (ref 3.6–5.5)
PREALB SERPL-MCNC: 19 MG/DL (ref 18–38)
PROPOXYPH UR QL SCN: NEGATIVE
PROTHROMBIN TIME: 13.5 SEC (ref 12–14.6)
RBC # BLD AUTO: 4.8 M/UL (ref 4.2–5.4)
SIGNIFICANT IND 70042: NORMAL
SITE SITE: NORMAL
SODIUM SERPL-SCNC: 137 MMOL/L (ref 135–145)
SOURCE SOURCE: NORMAL
TROPONIN T SERPL-MCNC: 6 NG/L (ref 6–19)
TROPONIN T SERPL-MCNC: <6 NG/L (ref 6–19)
WBC # BLD AUTO: 5.2 K/UL (ref 4.8–10.8)

## 2025-02-16 PROCEDURE — 83880 ASSAY OF NATRIURETIC PEPTIDE: CPT

## 2025-02-16 PROCEDURE — 84134 ASSAY OF PREALBUMIN: CPT

## 2025-02-16 PROCEDURE — 770001 HCHG ROOM/CARE - MED/SURG/GYN PRIV*

## 2025-02-16 PROCEDURE — 700111 HCHG RX REV CODE 636 W/ 250 OVERRIDE (IP): Mod: JZ | Performed by: STUDENT IN AN ORGANIZED HEALTH CARE EDUCATION/TRAINING PROGRAM

## 2025-02-16 PROCEDURE — 83735 ASSAY OF MAGNESIUM: CPT

## 2025-02-16 PROCEDURE — 84484 ASSAY OF TROPONIN QUANT: CPT

## 2025-02-16 PROCEDURE — 71045 X-RAY EXAM CHEST 1 VIEW: CPT

## 2025-02-16 PROCEDURE — A9270 NON-COVERED ITEM OR SERVICE: HCPCS | Performed by: STUDENT IN AN ORGANIZED HEALTH CARE EDUCATION/TRAINING PROGRAM

## 2025-02-16 PROCEDURE — 85025 COMPLETE CBC W/AUTO DIFF WBC: CPT

## 2025-02-16 PROCEDURE — 700102 HCHG RX REV CODE 250 W/ 637 OVERRIDE(OP): Performed by: STUDENT IN AN ORGANIZED HEALTH CARE EDUCATION/TRAINING PROGRAM

## 2025-02-16 PROCEDURE — 87205 SMEAR GRAM STAIN: CPT

## 2025-02-16 PROCEDURE — 87040 BLOOD CULTURE FOR BACTERIA: CPT

## 2025-02-16 PROCEDURE — 94640 AIRWAY INHALATION TREATMENT: CPT

## 2025-02-16 PROCEDURE — 94669 MECHANICAL CHEST WALL OSCILL: CPT

## 2025-02-16 PROCEDURE — 99406 BEHAV CHNG SMOKING 3-10 MIN: CPT

## 2025-02-16 PROCEDURE — 700101 HCHG RX REV CODE 250: Performed by: STUDENT IN AN ORGANIZED HEALTH CARE EDUCATION/TRAINING PROGRAM

## 2025-02-16 PROCEDURE — 36415 COLL VENOUS BLD VENIPUNCTURE: CPT

## 2025-02-16 PROCEDURE — 700105 HCHG RX REV CODE 258: Performed by: STUDENT IN AN ORGANIZED HEALTH CARE EDUCATION/TRAINING PROGRAM

## 2025-02-16 PROCEDURE — 99233 SBSQ HOSP IP/OBS HIGH 50: CPT | Mod: GC | Performed by: INTERNAL MEDICINE

## 2025-02-16 PROCEDURE — 82550 ASSAY OF CK (CPK): CPT

## 2025-02-16 PROCEDURE — 80069 RENAL FUNCTION PANEL: CPT

## 2025-02-16 PROCEDURE — 94664 DEMO&/EVAL PT USE INHALER: CPT

## 2025-02-16 RX ORDER — SODIUM CHLORIDE, SODIUM LACTATE, POTASSIUM CHLORIDE, CALCIUM CHLORIDE 600; 310; 30; 20 MG/100ML; MG/100ML; MG/100ML; MG/100ML
INJECTION, SOLUTION INTRAVENOUS CONTINUOUS
Status: ACTIVE | OUTPATIENT
Start: 2025-02-16 | End: 2025-02-16

## 2025-02-16 RX ORDER — DEXTROSE MONOHYDRATE 25 G/50ML
25 INJECTION, SOLUTION INTRAVENOUS
Status: DISCONTINUED | OUTPATIENT
Start: 2025-02-16 | End: 2025-02-18 | Stop reason: HOSPADM

## 2025-02-16 RX ORDER — LORAZEPAM 2 MG/ML
0.5 INJECTION INTRAMUSCULAR EVERY 4 HOURS PRN
Status: DISCONTINUED | OUTPATIENT
Start: 2025-02-16 | End: 2025-02-18 | Stop reason: HOSPADM

## 2025-02-16 RX ORDER — CEFUROXIME AXETIL 250 MG/1
250 TABLET ORAL EVERY 12 HOURS
Status: DISCONTINUED | OUTPATIENT
Start: 2025-02-17 | End: 2025-02-18 | Stop reason: HOSPADM

## 2025-02-16 RX ORDER — FOLIC ACID 1 MG/1
1 TABLET ORAL DAILY
Status: DISCONTINUED | OUTPATIENT
Start: 2025-02-16 | End: 2025-02-18 | Stop reason: HOSPADM

## 2025-02-16 RX ORDER — GAUZE BANDAGE 2" X 2"
100 BANDAGE TOPICAL DAILY
Status: DISCONTINUED | OUTPATIENT
Start: 2025-02-16 | End: 2025-02-18 | Stop reason: HOSPADM

## 2025-02-16 RX ADMIN — MOMETASONE FUROATE AND FORMOTEROL FUMARATE DIHYDRATE 2 PUFF: 200; 5 AEROSOL RESPIRATORY (INHALATION) at 09:13

## 2025-02-16 RX ADMIN — ENOXAPARIN SODIUM 40 MG: 100 INJECTION SUBCUTANEOUS at 17:09

## 2025-02-16 RX ADMIN — MONTELUKAST 10 MG: 10 TABLET, FILM COATED ORAL at 21:57

## 2025-02-16 RX ADMIN — METHYLPREDNISOLONE SODIUM SUCCINATE 40 MG: 40 INJECTION, POWDER, FOR SOLUTION INTRAMUSCULAR; INTRAVENOUS at 14:31

## 2025-02-16 RX ADMIN — SODIUM CHLORIDE, POTASSIUM CHLORIDE, SODIUM LACTATE AND CALCIUM CHLORIDE 990 ML: 600; 310; 30; 20 INJECTION, SOLUTION INTRAVENOUS at 01:13

## 2025-02-16 RX ADMIN — METHYLPREDNISOLONE SODIUM SUCCINATE 40 MG: 40 INJECTION, POWDER, FOR SOLUTION INTRAMUSCULAR; INTRAVENOUS at 21:57

## 2025-02-16 RX ADMIN — FAMOTIDINE 20 MG: 10 INJECTION, SOLUTION INTRAVENOUS at 17:09

## 2025-02-16 RX ADMIN — FAMOTIDINE 20 MG: 10 INJECTION, SOLUTION INTRAVENOUS at 05:35

## 2025-02-16 RX ADMIN — IPRATROPIUM BROMIDE AND ALBUTEROL SULFATE 3 ML: .5; 2.5 SOLUTION RESPIRATORY (INHALATION) at 03:05

## 2025-02-16 RX ADMIN — TIOTROPIUM BROMIDE INHALATION SPRAY 5 MCG: 3.12 SPRAY, METERED RESPIRATORY (INHALATION) at 09:12

## 2025-02-16 RX ADMIN — METHYLPREDNISOLONE SODIUM SUCCINATE 40 MG: 40 INJECTION, POWDER, FOR SOLUTION INTRAMUSCULAR; INTRAVENOUS at 01:08

## 2025-02-16 RX ADMIN — IPRATROPIUM BROMIDE AND ALBUTEROL SULFATE 3 ML: .5; 2.5 SOLUTION RESPIRATORY (INHALATION) at 14:01

## 2025-02-16 RX ADMIN — AZITHROMYCIN DIHYDRATE 500 MG: 250 TABLET ORAL at 17:09

## 2025-02-16 RX ADMIN — METHYLPREDNISOLONE SODIUM SUCCINATE 40 MG: 40 INJECTION, POWDER, FOR SOLUTION INTRAMUSCULAR; INTRAVENOUS at 05:35

## 2025-02-16 RX ADMIN — MONTELUKAST 10 MG: 10 TABLET, FILM COATED ORAL at 00:09

## 2025-02-16 RX ADMIN — IPRATROPIUM BROMIDE AND ALBUTEROL SULFATE 3 ML: .5; 2.5 SOLUTION RESPIRATORY (INHALATION) at 09:42

## 2025-02-16 RX ADMIN — CEFTRIAXONE SODIUM 2000 MG: 10 INJECTION, POWDER, FOR SOLUTION INTRAVENOUS at 09:13

## 2025-02-16 RX ADMIN — IPRATROPIUM BROMIDE AND ALBUTEROL SULFATE 3 ML: .5; 2.5 SOLUTION RESPIRATORY (INHALATION) at 07:29

## 2025-02-16 RX ADMIN — FOLIC ACID 1 MG: 1 TABLET ORAL at 05:35

## 2025-02-16 RX ADMIN — THERA TABS 1 TABLET: TAB at 05:34

## 2025-02-16 RX ADMIN — Medication 100 MG: at 05:34

## 2025-02-16 SDOH — ECONOMIC STABILITY: TRANSPORTATION INSECURITY
IN THE PAST 12 MONTHS, HAS THE LACK OF TRANSPORTATION KEPT YOU FROM MEDICAL APPOINTMENTS OR FROM GETTING MEDICATIONS?: NO

## 2025-02-16 SDOH — ECONOMIC STABILITY: TRANSPORTATION INSECURITY
IN THE PAST 12 MONTHS, HAS LACK OF RELIABLE TRANSPORTATION KEPT YOU FROM MEDICAL APPOINTMENTS, MEETINGS, WORK OR FROM GETTING THINGS NEEDED FOR DAILY LIVING?: NO

## 2025-02-16 ASSESSMENT — FIBROSIS 4 INDEX
FIB4 SCORE: 0.69
FIB4 SCORE: 0.69

## 2025-02-16 ASSESSMENT — ENCOUNTER SYMPTOMS
ORTHOPNEA: 0
DIARRHEA: 0
COUGH: 1
DEPRESSION: 0
WEAKNESS: 1
PALPITATIONS: 0
DIZZINESS: 0
MYALGIAS: 1
WHEEZING: 0
NAUSEA: 0
CONSTIPATION: 0
NECK PAIN: 0
VOMITING: 0
ABDOMINAL PAIN: 0
CHILLS: 1
CHILLS: 0
LOSS OF CONSCIOUSNESS: 1
HEARTBURN: 0
EYE DISCHARGE: 0
BRUISES/BLEEDS EASILY: 0
FEVER: 0
EYE PAIN: 0
HEADACHES: 0
WHEEZING: 1
BLURRED VISION: 0
FOCAL WEAKNESS: 0
HEMOPTYSIS: 0
BACK PAIN: 0
DOUBLE VISION: 0
SPUTUM PRODUCTION: 1
SHORTNESS OF BREATH: 1
SHORTNESS OF BREATH: 0

## 2025-02-16 ASSESSMENT — SOCIAL DETERMINANTS OF HEALTH (SDOH)
WITHIN THE PAST 12 MONTHS, YOU WORRIED THAT YOUR FOOD WOULD RUN OUT BEFORE YOU GOT THE MONEY TO BUY MORE: NEVER TRUE
IN THE PAST 12 MONTHS, HAS THE ELECTRIC, GAS, OIL, OR WATER COMPANY THREATENED TO SHUT OFF SERVICE IN YOUR HOME?: NO
WITHIN THE LAST YEAR, HAVE TO BEEN RAPED OR FORCED TO HAVE ANY KIND OF SEXUAL ACTIVITY BY YOUR PARTNER OR EX-PARTNER?: NO
WITHIN THE LAST YEAR, HAVE YOU BEEN AFRAID OF YOUR PARTNER OR EX-PARTNER?: NO
WITHIN THE PAST 12 MONTHS, THE FOOD YOU BOUGHT JUST DIDN'T LAST AND YOU DIDN'T HAVE MONEY TO GET MORE: NEVER TRUE
WITHIN THE LAST YEAR, HAVE YOU BEEN HUMILIATED OR EMOTIONALLY ABUSED IN OTHER WAYS BY YOUR PARTNER OR EX-PARTNER?: NO
WITHIN THE LAST YEAR, HAVE YOU BEEN KICKED, HIT, SLAPPED, OR OTHERWISE PHYSICALLY HURT BY YOUR PARTNER OR EX-PARTNER?: NO

## 2025-02-16 ASSESSMENT — COGNITIVE AND FUNCTIONAL STATUS - GENERAL
SUGGESTED CMS G CODE MODIFIER MOBILITY: CH
DAILY ACTIVITIY SCORE: 24
MOBILITY SCORE: 24
SUGGESTED CMS G CODE MODIFIER DAILY ACTIVITY: CH

## 2025-02-16 ASSESSMENT — LIFESTYLE VARIABLES
TOTAL SCORE: 0
TOTAL SCORE: 0
SUBSTANCE_ABUSE: 0
EVER HAD A DRINK FIRST THING IN THE MORNING TO STEADY YOUR NERVES TO GET RID OF A HANGOVER: NO
HAVE PEOPLE ANNOYED YOU BY CRITICIZING YOUR DRINKING: NO
AVERAGE NUMBER OF DAYS PER WEEK YOU HAVE A DRINK CONTAINING ALCOHOL: 0
TOTAL SCORE: 0
ALCOHOL_USE: YES
DOES PATIENT WANT TO STOP DRINKING: NO
ON A TYPICAL DAY WHEN YOU DRINK ALCOHOL HOW MANY DRINKS DO YOU HAVE: 1
EVER FELT BAD OR GUILTY ABOUT YOUR DRINKING: NO
HOW MANY TIMES IN THE PAST YEAR HAVE YOU HAD 5 OR MORE DRINKS IN A DAY: 0
HAVE YOU EVER FELT YOU SHOULD CUT DOWN ON YOUR DRINKING: NO
CONSUMPTION TOTAL: NEGATIVE

## 2025-02-16 ASSESSMENT — PAIN DESCRIPTION - PAIN TYPE
TYPE: ACUTE PAIN

## 2025-02-16 ASSESSMENT — PATIENT HEALTH QUESTIONNAIRE - PHQ9
1. LITTLE INTEREST OR PLEASURE IN DOING THINGS: NOT AT ALL
2. FEELING DOWN, DEPRESSED, IRRITABLE, OR HOPELESS: NOT AT ALL
SUM OF ALL RESPONSES TO PHQ9 QUESTIONS 1 AND 2: 0

## 2025-02-16 NOTE — PROGRESS NOTES
Report received from day shift RN, bedside report completed and eyes on the pt. Lab results and notes have been reviewed. The pt is resting in bed and all needs are met at this time. The bed is locked/lowered, and the call light is within reach. Continuing to monitor for any changes.

## 2025-02-16 NOTE — PROGRESS NOTES
Telemetry Report:      Rhythm: sr      Heart Rate: 71-85   Ectopy:            MD:  0.14           QRS:       0.09  QT:   0.35        Per Telestrip from Monitor Room

## 2025-02-16 NOTE — PROGRESS NOTES
4 Eyes Skin Assessment Completed by AALIYAH Christensen and AALIYAH Alberto.    Head WDL  Ears WDL  Nose WDL  Mouth WDL  Neck WDL  Breast/Chest WDL  Shoulder Blades WDL  Spine WDL  (R) Arm/Elbow/Hand WDL  (L) Arm/Elbow/Hand WDL  Abdomen WDL  Groin WDL  Scrotum/Coccyx/Buttocks Redness and Blanching  (R) Leg WDL  (L) Leg WDL  (R) Heel/Foot/Toe WDL  (L) Heel/Foot/Toe Redness and Blanching          Devices In Places Tele Box, Blood Pressure Cuff, Pulse Ox, and Nasal Cannula      Interventions In Place Gray Ear Foams, NC W/Ear Foams, and Pillows    Possible Skin Injury No    Pictures Uploaded Into Epic Yes  Wound Consult Placed N/A  RN Wound Prevention Protocol Ordered No

## 2025-02-16 NOTE — CARE PLAN
The patient is Stable - Low risk of patient condition declining or worsening    Shift Goals  Clinical Goals: monitor resp status, labs, VSS  Patient Goals: feel better  Family Goals: charlette    Progress made toward(s) clinical / shift goals:    Problem: Knowledge Deficit - Standard  Goal: Patient and family/care givers will demonstrate understanding of plan of care, disease process/condition, diagnostic tests and medications  Description: Target End Date:  1-3 days or as soon as patient condition allows    Document in Patient Education    1.  Patient and family/caregiver oriented to unit, equipment, visitation policy and means for communicating concern  2.  Complete/review Learning Assessment  3.  Assess knowledge level of disease process/condition, treatment plan, diagnostic tests and medications  4.  Explain disease process/condition, treatment plan, diagnostic tests and medications  Outcome: Progressing  Note: Went over plan of care, fall precautions, diet instruction, tests with patient. Pt verbalized understanding and all questions were answered     Problem: Respiratory  Goal: Patient will achieve/maintain optimum respiratory ventilation and gas exchange  Description: Target End Date:  Prior to discharge or change in level of care    Document on Assessment flowsheet    1.  Assess and monitor rate, rhythm, depth and effort of respiration  2.  Breath sounds assessed qshift and/or as needed  3.  Assess O2 saturation, administer/titrate oxygen as ordered  4.  Position patient for maximum ventilatory efficiency  5.  Turn, cough, and deep breath with splinting to improve effectiveness  6.  Collaborate with RT to administer medication/treatments per order  7.  Encourage use of incentive spirometer and encourage patient to cough after use and utilize splinting techniques if applicable  8.  Airway suctioning  9.  Monitor sputum production for changes in color, consistency and frequency  10. Perform frequent oral  hygiene  11. Alternate physical activity with rest periods  Outcome: Progressing  Note: Pt on 2L NC, tolerating well, RT on board. Awaiting sputum sample

## 2025-02-16 NOTE — ASSESSMENT & PLAN NOTE
Smoking cessation counseling provided  Nicotine gum ordered  She stated she is unable to tolerate nicotine patch

## 2025-02-16 NOTE — ASSESSMENT & PLAN NOTE
Smoking cessation strongly advised  Pulmonary hygiene  -DuoNebs, Dulera, Spiriva, montelukast  -PEP therapy  -Solu-Medrol 125 mg IV x1  -Continue Solu-Medrol 40 mg IV every 8 hours  -Wean off Solu-Medrol as tolerated  -Antibiotic: Ceftriaxone, azithromycin  -Sputum culture pending  -Wean off oxygen support as tolerated  -RT protocol

## 2025-02-16 NOTE — ASSESSMENT & PLAN NOTE
Denies illicit drug use, other than cannabis use  However, tested positive for methamphetamine  Multivitamins  Cessation counseling

## 2025-02-16 NOTE — ED TRIAGE NOTES
Chief Complaint   Patient presents with    Shortness of Breath     Pt brought in from home complaints of sob- h/o COPD     Pt arrived from Triage by wheel chair, awake, short of breath, in Tripod position, appears anxious and unable to complete a sentence.Accompanied by her sister. Family reported she has COPD, took albuterol and duoneb PTA but no relief.

## 2025-02-16 NOTE — ED NOTES
Med rec updated and complete. Allergies reviewed.     Pt denies anticoagulant medications.  No outpatient antibiotic use in last 30 days.      Preferred Pharmacy  [facility] = [phone]

## 2025-02-16 NOTE — H&P
Hospital Medicine History & Physical Note    Date of Service  2/15/2025    Primary Care Physician  Pcp Pt States None    Consultants  ICU (Dr. Ochoa) - to telemetry    Code Status  Full Code    Chief Complaint  Chief Complaint   Patient presents with    Shortness of Breath     Pt brought in from home complaints of sob- h/o COPD       History of Presenting Illness  Kenzie Huber is a 54 y.o. female with a history of COPD, tobacco abuse who presented 2/15/2025 with evaluation for shortness of breath.  She reported to daily tobacco smoking, has cut back down from half a pack to 1 pack of cigarettes daily to currently only less than 10 cigarettes smoking daily.  Denies illicit drug use.  Reported to have acute onset of severe shortness of breath, therefore presented emergency room for evaluation.  Upon ER arrival, hypotensive with BP of 82/40.  She was initially placed on BiPAP, only tolerated for approximately 30 minutes but eventually switched back to nasal cannula support.  Case was discussed with ICU attending, recommended admission to telemetry unit.  She has received treatment with DuoNebs, albuterol, Solu-Medrol 125 mg IV, ceftriaxone and azithromycin.  Admission requested by ERP.  Therefore, admitted to medicine service for further evaluation and treatment.    I discussed the plan of care with patient, bedside RN, charge RN, pharmacy, and critical care.    Review of Systems  Review of Systems   Constitutional:  Positive for chills and malaise/fatigue. Negative for fever.   HENT:  Negative for hearing loss and tinnitus.    Eyes:  Negative for blurred vision and double vision.   Respiratory:  Positive for cough, sputum production, shortness of breath and wheezing. Negative for hemoptysis.    Cardiovascular:  Negative for chest pain and palpitations.   Gastrointestinal:  Negative for abdominal pain, heartburn, nausea and vomiting.   Genitourinary:  Negative for dysuria and urgency.   Musculoskeletal:  Positive  for myalgias. Negative for joint pain.   Skin:  Negative for itching and rash.   Neurological:  Positive for weakness. Negative for dizziness, focal weakness and headaches.   Endo/Heme/Allergies:  Negative for environmental allergies. Does not bruise/bleed easily.   Psychiatric/Behavioral:  Negative for depression and substance abuse.    All other systems reviewed and are negative.      Past Medical History   has a past medical history of COPD (chronic obstructive pulmonary disease) ([facility]).    Surgical History   has a past surgical history that includes tonsillectomy and adenoidectomy.     Family History  family history is not on file.   Family history reviewed with patient. There is no family history that is pertinent to the chief complaint.     Social History   reports that she has been smoking cigarettes. She does not have any smokeless tobacco history on file. She reports that she does not drink alcohol and does not use drugs.    Allergies  Allergies   Allergen Reactions    Naproxen Anaphylaxis    Pcn [Penicillins] Rash     > 30 years ago       Medications  Prior to Admission Medications   Prescriptions Last Dose Informant Patient Reported? Taking?   albuterol 108 (90 Base) MCG/ACT Aero Soln inhalation aerosol 2/15/2025 Evening  No Yes   Sig: Inhale 2 Puffs every four hours as needed for Shortness of Breath (cough due to bronchospasm/COPD).      Facility-Administered Medications: None       Physical Exam  Temp:  [36.2 °C (97.2 °F)-36.9 °C (98.5 °F)] 36.2 °C (97.2 °F)  Pulse:  [72-94] 94  Resp:  [12-29] 16  BP: ()/(40-84) 132/81  SpO2:  [90 %-99 %] 92 %  Blood Pressure: 117/61   Temperature: 36.9 °C (98.5 °F)   Pulse: 89   Respiration: 19   Pulse Oximetry: 93 %       Physical Exam  Vitals and nursing note reviewed.   Constitutional:       General: She is in acute distress.   HENT:      Head: Normocephalic and atraumatic.      Nose: Nose normal.      Mouth/Throat:      Mouth: Mucous membranes are dry.       "Pharynx: Oropharynx is clear.   Eyes:      General: No scleral icterus.  Cardiovascular:      Rate and Rhythm: Regular rhythm. Tachycardia present.      Pulses: Normal pulses.      Heart sounds:      No friction rub.   Pulmonary:      Effort: No respiratory distress.      Breath sounds: No stridor. Wheezing and rales present. No rhonchi.   Chest:      Chest wall: No tenderness.   Abdominal:      General: There is no distension.      Tenderness: There is no abdominal tenderness. There is no guarding or rebound.   Musculoskeletal:         General: Normal range of motion.      Cervical back: Neck supple. No tenderness.      Right lower leg: No edema.      Left lower leg: No edema.   Skin:     General: Skin is warm and dry.      Capillary Refill: Capillary refill takes less than 2 seconds.   Neurological:      General: No focal deficit present.      Mental Status: She is alert and oriented to person, place, and time.   Psychiatric:         Mood and Affect: Mood normal.         Laboratory:  Recent Labs     02/15/25  1947   WBC 6.6   RBC 5.47*   HEMOGLOBIN 16.1*   HEMATOCRIT 48.9*   MCV 89.4   MCH 29.4   MCHC 32.9   RDW 48.2   PLATELETCT 328   MPV 9.9     Recent Labs     02/15/25  1947   SODIUM 141   POTASSIUM 4.6   CHLORIDE 107   CO2 22   GLUCOSE 83   BUN 16   CREATININE 0.84   CALCIUM 9.5     Recent Labs     02/15/25  1947   ALTSGPT 18   ASTSGOT 18   ALKPHOSPHAT 111*   TBILIRUBIN 0.2   GLUCOSE 83     Recent Labs     02/15/25  2355   INR 1.03     No results for input(s): \"NTPROBNP\" in the last 72 hours.      No results for input(s): \"TROPONINT\" in the last 72 hours.    Imaging:  DX-CHEST-PORTABLE (1 VIEW)   Final Result      1.  No overt failure or pneumonia.   2.  Likely COPD.      EC-ECHOCARDIOGRAM COMPLETE W/O CONT    (Results Pending)       X-Ray:  I have personally reviewed the images and compared with prior images.  EKG:  I have personally reviewed the images and compared with prior " images.    Assessment/Plan:  Justification for Admission Status  I anticipate this patient will require at least 2 midnights of hospitalization, therefore appropriate for inpatient status.      * Acute bronchitis with chronic obstructive pulmonary disease (COPD) (HCC)- (present on admission)  Assessment & Plan  Smoking cessation strongly advised  Pulmonary hygiene  -DuoNebs, Dulera, Spiriva, montelukast  -PEP therapy  -Solu-Medrol 125 mg IV x1  -Continue Solu-Medrol 40 mg IV every 8 hours  -Wean off Solu-Medrol as tolerated  -Antibiotic: Ceftriaxone, azithromycin  -Follow cultures  -Wean off oxygen support as tolerated  -RT protocol    Bacteremia  Assessment & Plan  True bacteremia versus contamination  1 out of 2 positive blood culture positive at this time  --Await final result    Already on antibiotics (ceftriaxone, azithromycin) as noted in COPD exacerbation  She denies IVDA  -Check TTE for valvular vegetation evaluation  May consider repeat blood cultures pending clinical course    Acute respiratory failure with hypoxia (HCC)  Assessment & Plan  Initially required BiPAP for 30 minutes  Weaned back down to 2 L nasal cannula support-continue to wean off as tolerated  COPD exacerbation being treated  She tested negative for COVID/influenza/RSV  Check TTE      Hypoglycemia  Assessment & Plan  Oral dietary intake as tolerated  Hypoglycemia protocol    Mild protein-calorie malnutrition (HCC)  Assessment & Plan  Ensure  Dietary intake as tolerated  Body mass index is 20.56 kg/m².  Nutrition eval    Dyspepsia  Assessment & Plan  IV Pepcid for now    Hypotension  Assessment & Plan  Improved/resolved  Check cortisol level  IVF    Tobacco abuse counseling  Assessment & Plan  Smoking cessation counseling provided  Nicotine gum ordered  She stated she is unable to tolerate nicotine patch    Positive urine drug screen  Assessment & Plan  Denies illicit drug use, other than cannabis use  However, tested positive for  methamphetamine  Multivitamins  Cessation counseling        VTE prophylaxis: enoxaparin ppx    Critical care time: 45 minutes spent in chart review, medical management, coordinating care with patient/ER staff/RN/RT/pharmacy/consulting provider/frequent reevaluations of patient clinical response to treatment

## 2025-02-16 NOTE — ED NOTES
Pt appears anxious and unable to tolerated BIPAP, seen by Dr quiñones-- Versed 1 mg IV given as ordered

## 2025-02-16 NOTE — PROGRESS NOTES
[facility] Internal Medicine Daily Progress Note    Date of Service  2/16/2025    [facility] Team: R IM Blue Team   Attending: Rajesh Edmonds M.d.  Senior Resident: Dr. Vipul Sifuentes  Intern:  Dr. Amanda Heck  Contact Number: [phone]    Chief Complaint  Kenzie Huber is a 54 y.o. female admitted 2/15/2025 with acute shortness of breath for 1 day.    Hospital Course  Kenzie Huber is a 54 y.o. female with a history of COPD, tobacco abuse who was admitted on 2/15/2025 for evaluation acute onset shortness of breath with acute hypoxic respiratory failure.  She reported to daily tobacco smoking, has cut back down from half a pack to 1 pack of cigarettes daily to currently only less than 10 cigarettes smoking daily.  Denies illicit drug use.  Reported to have acute onset of severe shortness of breath, therefore presented emergency room for evaluation.  Upon ER arrival, hypotensive with BP of 82/40.  She was initially placed on BiPAP, only tolerated for approximately 30 minutes but eventually switched back to nasal cannula support.  Case was discussed with ICU attending, recommended admission to telemetry unit.  She has received treatment with DuoNebs, albuterol, Solu-Medrol 125 mg IV, ceftriaxone and azithromycin.  Admission requested by ERP.  Therefore, admitted to medicine service for further evaluation and treatment.     Interval Problem Update  02/16/2025: There were no acute events overnight.  The patient was seen at bedside, stated that her shortness of breath had significantly improved.  Her UDS was positive for amphetamines, cannabinoids and benzodiazepines, she denies IVDU.  Her BNP was slightly elevated at admission, troponins were within normal range and x-ray was negative for pulmonary edema, cardiac cause of the acute hypoxic respiratory failure is less likely, TTE was canceled.  Given her single positive blood culture, a repeat set of blood cultures was ordered, if positive and echo can be ordered.   Her current treatment plan will be continued and after a home oxygen evaluation, she could be considered for discharge on 02/17/2025.    I have discussed this patient's plan of care and discharge plan at IDT rounds today with Case Management, Nursing, Nursing leadership, and other members of the IDT team.    Consultants/Specialty  None    Code Status  Full Code    Disposition  The patient is not medically cleared for discharge to home or a post-acute facility.      I have placed the appropriate orders for post-discharge needs.    Review of Systems  Review of Systems   Constitutional:  Negative for chills, fever and malaise/fatigue.   HENT:  Negative for ear discharge and ear pain.    Eyes:  Negative for pain and discharge.   Respiratory:  Positive for cough and sputum production. Negative for shortness of breath and wheezing.    Cardiovascular:  Negative for chest pain, palpitations, orthopnea and leg swelling.   Gastrointestinal:  Negative for abdominal pain, constipation, diarrhea, heartburn, nausea and vomiting.   Genitourinary:  Negative for dysuria, frequency and urgency.   Musculoskeletal:  Positive for myalgias. Negative for back pain and neck pain.   Skin:  Negative for rash.   Neurological:  Positive for loss of consciousness and weakness. Negative for dizziness and headaches.   Endo/Heme/Allergies:  Does not bruise/bleed easily.   Psychiatric/Behavioral:  Negative for depression and suicidal ideas.         Physical Exam  Temp:  [36.2 °C (97.2 °F)-37 °C (98.6 °F)] 37 °C (98.6 °F)  Pulse:  [] 102  Resp:  [12-29] 18  BP: ()/(40-84) 98/65  SpO2:  [90 %-99 %] 94 %    Physical Exam  Constitutional:       General: She is not in acute distress.     Appearance: Normal appearance. She is not ill-appearing.   HENT:      Head: Normocephalic and atraumatic.      Right Ear: Tympanic membrane normal.      Left Ear: Tympanic membrane normal.      Nose: Nose normal. No congestion or rhinorrhea.       Mouth/Throat:      Mouth: Mucous membranes are moist.      Pharynx: No oropharyngeal exudate.   Eyes:      Extraocular Movements: Extraocular movements intact.      Pupils: Pupils are equal, round, and reactive to light.   Cardiovascular:      Rate and Rhythm: Normal rate and regular rhythm.      Pulses: Normal pulses.      Heart sounds: No murmur heard.     No gallop.   Pulmonary:      Effort: Pulmonary effort is normal. No respiratory distress.      Breath sounds: Wheezing present. No rales.   Abdominal:      General: Abdomen is flat. Bowel sounds are normal. There is no distension.      Palpations: Abdomen is soft.      Tenderness: There is no abdominal tenderness. There is no guarding.   Musculoskeletal:         General: No swelling or deformity. Normal range of motion.      Cervical back: Normal range of motion. No rigidity.      Right lower leg: No edema.      Left lower leg: No edema.   Lymphadenopathy:      Cervical: No cervical adenopathy.   Skin:     General: Skin is warm.      Coloration: Skin is not jaundiced.   Neurological:      General: No focal deficit present.      Mental Status: She is alert and oriented to person, place, and time.   Psychiatric:         Mood and Affect: Mood normal.         Behavior: Behavior normal.         Fluids    Intake/Output Summary (Last 24 hours) at 2/16/2025 1527  Last data filed at 2/16/2025 1200  Gross per 24 hour   Intake 2377.68 ml   Output 0 ml   Net 2377.68 ml       Laboratory  Recent Labs     02/15/25  1947 02/16/25  0819   WBC 6.6 5.2   RBC 5.47* 4.80   HEMOGLOBIN 16.1* 14.3   HEMATOCRIT 48.9* 43.2   MCV 89.4 90.0   MCH 29.4 29.8   MCHC 32.9 33.1   RDW 48.2 48.4   PLATELETCT 328 259   MPV 9.9 9.9     Recent Labs     02/15/25  1946 02/16/25  0819   SODIUM 141 137   POTASSIUM 4.6 4.2   CHLORIDE 107 103   CO2 22 20   GLUCOSE 83 167*   BUN 16 13   CREATININE 0.84 0.79   CALCIUM 9.5 9.2     Recent Labs     02/15/25  2355   INR 1.03                Imaging  DX-CHEST-PORTABLE (1 VIEW)   Final Result      1.  No overt failure or pneumonia.   2.  Likely COPD.           Assessment/Plan  Problem Representation:    * Acute bronchitis with chronic obstructive pulmonary disease (COPD) ([facility])- (present on admission)  Assessment & Plan  Smoking cessation strongly advised  Pulmonary hygiene  -DuoNebs, Dulera, Spiriva, montelukast  -PEP therapy  -Solu-Medrol 125 mg IV x1  -Continue Solu-Medrol 40 mg IV every 8 hours  -Wean off Solu-Medrol as tolerated  -Antibiotic: Ceftriaxone, azithromycin  -Follow cultures  -Wean off oxygen support as tolerated  -RT protocol    Hypoglycemia  Assessment & Plan  Oral dietary intake as tolerated  Hypoglycemia protocol    Mild protein-calorie malnutrition (HCC)  Assessment & Plan  Ensure  Dietary intake as tolerated  Body mass index is 20.56 kg/m².  Nutrition eval    Dyspepsia  Assessment & Plan  IV Pepcid for now    Hypotension  Assessment & Plan  Improved/resolved  Check cortisol level  IVF    Tobacco abuse counseling  Assessment & Plan  Smoking cessation counseling provided  Nicotine gum ordered  She stated she is unable to tolerate nicotine patch    Positive urine drug screen  Assessment & Plan  Denies illicit drug use, other than cannabis use  However, tested positive for methamphetamine  Multivitamins  Cessation counseling    Bacteremia  Assessment & Plan  True bacteremia versus contamination  1 out of 2 positive blood culture positive at this time  --Await final result    Already on antibiotics (ceftriaxone, azithromycin) as noted in COPD exacerbation  She denies IVDA  -Repeat blood cultures ordered 02/16    Acute respiratory failure with hypoxia ([facility])  Assessment & Plan  Initially required BiPAP for 30 minutes  Weaned back down to 2 L nasal cannula support-continue to wean off as tolerated  She tested negative for COVID/influenza/RSV  -COPD exacerbation being treated  - Cardiac cause of acute hypoxic respiratory failure is  unlikely, TTE canceled           VTE prophylaxis: enoxaparin ppx    I have performed a physical exam and reviewed and updated ROS and Plan today (2/16/2025). In review of yesterday's note (2/15/2025), there are no changes except as documented above.

## 2025-02-16 NOTE — CARE PLAN
The patient is Stable - Low risk of patient condition declining or worsening    Shift Goals  Clinical Goals: Wean oxygen, resp treatment  Patient Goals: Go home  Family Goals: KRISHNA    Progress made toward(s) clinical / shift goals:    Problem: Knowledge Deficit - Standard  Goal: Patient and family/care givers will demonstrate understanding of plan of care, disease process/condition, diagnostic tests and medications  Outcome: Progressing     Problem: Knowledge Deficit - COPD  Goal: Patient/significant other demonstrates understanding of disease process, utilization of the Action Plan, medications and discharge instruction  Outcome: Progressing     Problem: Risk for Infection - COPD  Goal: Patient will remain free from signs and symptoms of infection  Outcome: Progressing     Problem: Nutrition - Advanced  Goal: Patient will display progressive weight gain toward goal have adequate food and fluid intake  Outcome: Progressing       Patient is not progressing towards the following goals:

## 2025-02-16 NOTE — ASSESSMENT & PLAN NOTE
True bacteremia versus contamination  1 out of 2 positive blood culture positive at this time  --Await final result    Already on antibiotics (ceftriaxone, azithromycin) as noted in COPD exacerbation  She denies IVDA  -Repeat blood cultures ordered 02/16, negative

## 2025-02-16 NOTE — ED PROVIDER NOTES
ED Provider Note    CHIEF COMPLAINT  Shortness of breath    EXTERNAL RECORDS REVIEWED  Outpatient Notes patient seen in the ER 12/20/2024 with shortness of breath caused by an exacerbation of her underlying COPD.     HPI/ROS  LIMITATION TO HISTORY   Select: : None  OUTSIDE HISTORIAN(S):  Friend contributing to history below    Kenzie Huber is a 54 y.o. female who presents to the ER for evaluation of shortness of breath.  Patient reports has been feeling ill for 2 days with a productive cough though today has not been able to cough anything up and feels like it is stuck in her chest.  This afternoon the patient began to get short of breath and feels much more short of breath currently.  She does not wear oxygen at home typically.  She reports subjective fevers and chills but none measured.  She denies leg swelling, abdominal pain, nausea or vomiting.  She tried DuoNebs at home with no success.    PAST MEDICAL HISTORY   has a past medical history of COPD (chronic obstructive pulmonary disease) (HCC).    SURGICAL HISTORY   has a past surgical history that includes tonsillectomy and adenoidectomy.    FAMILY HISTORY  No family history on file.    SOCIAL HISTORY  Social History     Tobacco Use    Smoking status: Every Day     Current packs/day: 0.50     Types: Cigarettes    Smokeless tobacco: Not on file    Tobacco comments:     1/2 pack a day.    Substance and Sexual Activity    Alcohol use: Never     Comment: Rarely    Drug use: Never    Sexual activity: Not on file       CURRENT MEDICATIONS  Home Medications       Reviewed by Amparo Velasquez R.N. (Registered Nurse) on 02/16/25 at 0101  Med List Status: Complete     Medication Last Dose Status   albuterol 108 (90 Base) MCG/ACT Aero Soln inhalation aerosol 2/15/2025 Active                  Audit from Redirected Encounters    **Home medications have not yet been reviewed for this encounter**         ALLERGIES  Allergies   Allergen Reactions    Naproxen Anaphylaxis  "   Pcn [Penicillins] Rash     > 30 years ago       PHYSICAL EXAM  VITAL SIGNS: /81   Pulse 94   Temp 36.2 °C (97.2 °F) (Temporal)   Resp 16   Ht 1.676 m (5' 6\")   Wt 51 kg (112 lb 7 oz)   SpO2 92%   BMI 18.15 kg/m²    Constitutional: Anxious ill-appearing tripoding  HEENT: Atraumatic, normocephalic, pupils are equal round reactive to light, nose normal, mouth shows dry mucous membranes  Neck: Supple, no JVD, no tracheal deviation  Cardiovascular: Regular rate and rhythm, no murmur, rub or gallop, 2+ pulses peripherally x4  Thorax & Lungs: Tachypneic in respiratory distress, tripoding with diminished breath sounds and wheezing throughout  GI: Soft, non-distended, non-tender, no rebound  Skin: Warm, dry, no acute rash or lesion  Musculoskeletal: Moving all extremities, no acute deformity, no edema, no tenderness  Neurologic: A&Ox3, at baseline mentation  Psychiatric: Very anxious      EKG/LABS  Labs Reviewed   CBC WITH DIFFERENTIAL - Abnormal; Notable for the following components:       Result Value    RBC 5.47 (*)     Hemoglobin 16.1 (*)     Hematocrit 48.9 (*)     Neutrophils-Polys 35.60 (*)     Lymphocytes 43.80 (*)     Eosinophils 11.10 (*)     Eos (Absolute) 0.73 (*)     All other components within normal limits   COMP METABOLIC PANEL - Abnormal; Notable for the following components:    Alkaline Phosphatase 111 (*)     Globulin 3.6 (*)     All other components within normal limits   URINALYSIS - Abnormal; Notable for the following components:    Leukocyte Esterase Small (*)     All other components within normal limits   BLOOD CULTURE - Abnormal; Notable for the following components:    Significant Indicator POS (*)     Culture Result   (*)     Value: A significant status has been triggered by the BACTEC  instrument due to an increase in CO2 production.  Gram  stain of blood culture bottle shows NO ORGANISMS SEEN.  Further investigation is in progress.  Note: Blood cultures are incubated for 5 days " and  are monitored continuously. Positive blood cultures  are called to the RN and reported as soon as  they are identified.      All other components within normal limits   URINE DRUG SCREEN - Abnormal; Notable for the following components:    Amphetamines Urine Positive (*)     Benzodiazepines Positive (*)     Cannabinoid Metab Positive (*)     All other components within normal limits   URINE MICROSCOPIC (W/UA) - Abnormal; Notable for the following components:    WBC 11-20 (*)     All other components within normal limits   LACTIC ACID   BLOOD CULTURE   ESTIMATED GFR   SED RATE   CORTISOL   PROCALCITONIN   CRP QUANTITIVE (NON-CARDIAC)   PROTHROMBIN TIME   URINE CULTURE(NEW)   CULTURE RESPIRATORY W/ GRM STN   CREATINE KINASE   PROBRAIN NATRIURETIC PEPTIDE, NT   TROPONIN   POCT COV-2, FLU A/B, RSV BY PCR   POC COV-2, FLU A/B, RSV BY PCR       Results for orders placed or performed during the hospital encounter of 02/15/25   EKG   Result Value Ref Range    Report       [facility] Emergency Dept.    Test Date:  2025-02-15  Pt Name:    GLORY OLIVEROS            Department: ER  MRN:        [id number]                      Room:        12  Gender:     Female                       Technician: [id number]  :        1970                   Requested By:SAVANA SCOTT  Order #:    [id number]                    Reading MD:    Measurements  Intervals                                Axis  Rate:       67                           P:          84  SC:         159                          QRS:        99  QRSD:       116                          T:          81  QT:         441  QTc:        466    Interpretive Statements  Sinus rhythm  Biatrial enlargement  Nonspecific intraventricular conduction delay  Compared to ECG 2024 04:06:06  Atrial abnormality now present  Intraventricular conduction delay now present  Sinus bradycardia no longer present         I have independently interpreted this  EKG    RADIOLOGY/PROCEDURES   I have independently interpreted the diagnostic imaging associated with this visit and am waiting the final reading from the radiologist.   My preliminary interpretation is as follows: Chest x-ray demonstrates no pneumonia.  Hyperexpansion likely reflective of COPD    Radiologist interpretation:  DX-CHEST-PORTABLE (1 VIEW)   Final Result      1.  No overt failure or pneumonia.   2.  Likely COPD.      EC-ECHOCARDIOGRAM COMPLETE W/O CONT    (Results Pending)       COURSE & MEDICAL DECISION MAKING    ASSESSMENT, COURSE AND PLAN  Care Narrative:     Patient arrives to the emergency department for evaluation of severe shortness of breath.  History of COPD though demonstrates poor insight into her disease process or appropriate utilization of her outpatient medications or need for oxygen therapy.  Patient hypotensive initially though repeat blood pressure normal and I suspect this was possibly erroneous.  She is tachypneic and in respiratory distress, hypoxic however.  Suspect severe COPD exacerbation given diminished breath sounds and wheezing diffusely.  Considered pneumonia as well.  Respiratory therapy contacted for initiation of BiPAP with continuous albuterol and ipratropium.  Patient also administered IV magnesium and Solu-Medrol.  This resulted in significant symptom improvement and decrease in her work of breathing, improved aeration.  Workup including chest x-ray and laboratory panel not suggestive of superimposed bacterial pneumonia given no focal consolidation on chest x-ray, no significant leukocytosis or elevation of procalcitonin or inflammatory markers.  Viral testing for influenza RSV and COVID-negative.  EKG was obtained without evidence of acute ischemia and patient does not endorse any chest pain suggestive of PE or ACS.  No history of heart failure nor presentation suggestive of acute onset heart failure or fluid overload.  Interestingly blood culture resulted positive  though at this point I suspect this may be a contaminant however patient did receive broad-spectrum antibiotics including ceftriaxone and azithromycin given her ill appearance at time of presentation and concern for potential sepsis.  She also received full 30 cc/kg IV fluid per sepsis protocol given her initial recorded hypotension and tachypnea though ultimately does not appear to be septic with vital sign normalization, no leukocytosis, normal lactate.  Case was ultimately discussed with Dr. Ochoa, intensivist for IMC versus telemetry floor admission and he feels comfortable with the patient being mated to the floor and I am in agreement given patient appears very well at this time, we were able to space albuterol treatments out to 2 hours, she has no evidence of chronic hypercarbia or acute hypercarbia and is mentating appropriately.  Case discussed with Dr. Bhagat, hospitalist who accepts the patient for admission.    Hydration: Based on the patient's presentation of Dehydration and Hypotension the patient was given IV fluids. IV Hydration was used because oral hydration was not adequate alone. Upon recheck following hydration, the patient was improved.  Sepsis: Infection was suspected 8:00 PM (Time). Sepsis pathway was initiated. 30cc/kg bolus given. Antibiotics were given per protocol.      CRITICAL CARE  The very real possibilty of a deterioration of this patient's condition required the highest level of my preparedness for sudden, emergent intervention.  I provided critical care services, which included medication orders, frequent reevaluations of the patient's condition and response to treatment, ordering and reviewing test results, and discussing the case with various consultants.  The critical care time associated with the care of the patient was 30 minutes. Review chart for interventions. This time is exclusive of any other billable procedures.       ADDITIONAL PROBLEMS MANAGED  None    DISPOSITION AND  DISCUSSIONS  I have discussed management of the patient with the following physicians and ANA LUISA's: Intensivist and hospitalist services    Decision tools and prescription drugs considered including, but not limited to: Antibiotics ceftriaxone and azithromycin .    FINAL DIAGNOSIS  1. Acute exacerbation of chronic obstructive pulmonary disease (COPD) (HCC)    2. Acute hypoxic respiratory failure (HCC)    3.      Critical care time 30 minutes     Electronically signed by: Kyrie Hood M.D., 2/15/2025 7:52 PM

## 2025-02-16 NOTE — HOSPITAL COURSE
Kenzie Huber is a 54 y.o. female with a history of COPD, tobacco abuse who was admitted on 2/15/2025 for evaluation acute onset shortness of breath with acute hypoxic respiratory failure.  She reported to daily tobacco smoking, has cut back down from half a pack to 1 pack of cigarettes daily to currently only less than 10 cigarettes smoking daily.  Denies illicit drug use.  Reported to have acute onset of severe shortness of breath, therefore presented emergency room for evaluation.  Upon ER arrival, hypotensive with BP of 82/40.  She was initially placed on BiPAP, only tolerated for approximately 30 minutes but eventually switched back to nasal cannula support.  Case was discussed with ICU attending, recommended admission to telemetry unit.  She received treatment with DuoNebs, albuterol, Solu-Medrol 125 mg IV, ceftriaxone and azithromycin.  Admission requested by ERP.  Upon admission, patient was monitored closely with pulse oximetry and cardiac monitoring, she was transition to cefuroxime, she had concerning blood culture initially, but repeat blood culture remained negative to date, her oxygen requirement slowly decreased over time, she was given full course of steroids, RT protocol for for lung disease.  Other problems including hypoglycemia, dyspepsia, hypotension, and tobacco use are manageable and patient is well.  By 2/18/2025, patient's blood cultures had remained without any growth, she had symptomatically improved, she was on room air, and she was found fit for discharge home with close outpatient follow-up including refills of inhaler, and recommendation that she establish with primary care physician upon discharge.

## 2025-02-16 NOTE — ASSESSMENT & PLAN NOTE
Initially required BiPAP for 30 minutes  Weaned back down to 2 L nasal cannula support-continue to wean off as tolerated  She tested negative for COVID/influenza/RSV  -COPD exacerbation being treated  - Cardiac cause of acute hypoxic respiratory failure is unlikely, TTE canceled

## 2025-02-16 NOTE — CARE PLAN
Problem: Bronchoconstriction  Goal: Improve in air movement and diminished wheezing  Description: Target End Date:  2 to 3 days    1.  Implement inhaled treatments  2.  Evaluate and manage medication effects  Outcome: Progressing   Duo Q4  Dulera BID  Spiriva Qday    Problem: Bronchopulmonary Hygiene  Goal: Increase mobilization of retained secretions  Description: Target End Date:  2 to 3 days    1.  Perform bronchopulmonary therapy as indicated by assessment  2.  Perform airway suctioning  3.  Perform actions to maintain patient airway  Outcome: Progressing   Flutter QID

## 2025-02-16 NOTE — RESPIRATORY CARE
"COPD EDUCATION by COPD CLINICAL EDUCATOR  2/16/2025  at  10:30 AM by Chetna Zaidi, RRT     Patient interviewed by education team at the request of her admitting team.  Patient declined to participate in the full program.  She has been seen multiple times by our team on previous admissions. We reviewed her new daily medications- Dulera and Spiriva and proper care/cleaning of her nebulizer's.   A comprehensive packet including information about COPD, types of treatments to manage their disease and safe home Oxygen usage was provided and reviewed with patient at the bedside.       Smoking Cessation Intervention and education completed, 9 minutes spent on smoking cessation education with patient.  Provided smoking cessation packet with \"Tips to Quit\" and brochure for \"Free Virtual Smoking Cessation Classes\".     COPD Screen  COPD Risk Screening  Do you have a history of COPD?: Yes    COPD Assessment  COPD Clinical Specialists ONLY  COPD Education Initiated: Yes--Short Intervention (pleasant Poly + inhaler know she needs to quit; seen in Jan '25. here with [facility] reviewed use of Inhalers andgave spacer; D/W team to order nebulizer meds and consider Spiriva and Dulera for discharge Walk test pending)  Is this a COPD exacerbation patient?: Yes ([facility] team)  DME Company: none at present ([employer] in past)  DME Equipment Type: nebulizer -requested script for duoneb refills  Physician Name: Provided contact info for [facility] and [facility] to establish  Pulmonologist Name: provided list and requested out pt referral considersation  Referrals Initiated: Yes  Smoking Cessation: Yes  $ Smoking Cessation 3-10 Minutes: Symptomatic (provide class info and long discussion af triggers and options such as NRT)  Home Health Care:  (TBD)  Mobile Urgent Care Services: Declined  [facility]: N/A  $ Demo/Eval of SVN's, MDI's and Aerosols: Yes (gave spacer with instruction)  (OP) Pulmonary Function Testing:  " "(none)  Interdisciplinary Rounds: Attendance at Rounds (30 Min)    PFT Results    No results found for: \"PFT\"    Meds to Beds  [facility] provides bedside medication delivery for all eligible patients at discharge and you have been automatically enrolled in the Meds to Beds Program. Would you like to opt out of this program for any reason?: No - Stay Opted In     MY COPD ACTION PLAN     It is recommended that patients and physicians/healthcare providers complete this action plan together. This plan should be discussed at each physician visit and updated as needed.    The green, yellow and red zones show groups of symptoms of COPD. This list of symptoms is not comprehensive, and you may experience other symptoms. In the \"Actions\" column, your healthcare provider has recommended actions for you to take based on your symptoms.    Patient Name: Knezie Huber   YOB: 1970   Last Updated on:     Green Zone:  I am doing well today Actions     Usual activitiy and exercise level   Take daily medications     Usual amounts of cough and phlegm/mucus   Use oxygen as prescribed     Sleep well at night   Continue regular exercise/diet plan     Appetite is good   At all times avoid cigarette smoke, inhaled irritants     Daily Medications (these medications are taken every day):   Mometasone/Formoterol (Dulera)  Tiotropium Bromide Monohydrate (Spiriva) 2 Puffs  2 Puffs Twice daily  Once daily     Additional Information:  Remember to use spacer with Dulera and rinse mouth after using    Yellow Zone:  I am having a bad day or a COPD flare Actions     More breathless than usual   Continue daily medications     I have less energy for my daily activities   Use quick relief inhaler as ordered     Increased or thicker phlegm/mucus   Use oxygen as prescribed     Using quick relief inhaler/nebulizer more often   Get plenty of rest     Swelling of ankles more than usual   Use pursed lip breathing     More coughing than usual   " "At all times avoid cigarette smoke, inhaled irritants     I feel like I have a \"chest cold\"     Poor sleep and my symptoms woke me up     My appetite is not good     My medicine is not helping      Call provider immediately if symptoms don’t improve     Continue daily medications, add rescue medications:   Albuterol  Albuterol/Ipratropium (Combivent, Duoneb) 2 Puffs  3mL via nebulizer Every 4 hours PRN         Medications to be used during a flare up, (as Discussed with Provider):           Additional Information:  Keep your nebulizer kit clean every day of use  Use your spacer with rescue inhaler    Red Zone:  I need urgent medical care Actions     Severe shortness of breath even at rest   Call 911 or seek medical care immediately     Not able to do any activity because of breathing      Fever or shaking chills      Feeling confused or very drowsy       Chest pains      Coughing up blood                  "

## 2025-02-17 LAB
ALBUMIN SERPL BCP-MCNC: 3.9 G/DL (ref 3.2–4.9)
ALBUMIN/GLOB SERPL: 1.3 G/DL
ALP SERPL-CCNC: 109 U/L (ref 30–99)
ALT SERPL-CCNC: 18 U/L (ref 2–50)
ANION GAP SERPL CALC-SCNC: 13 MMOL/L (ref 7–16)
AST SERPL-CCNC: 19 U/L (ref 12–45)
BASOPHILS # BLD AUTO: 0.1 % (ref 0–1.8)
BASOPHILS # BLD: 0.02 K/UL (ref 0–0.12)
BILIRUB SERPL-MCNC: <0.2 MG/DL (ref 0.1–1.5)
BUN SERPL-MCNC: 22 MG/DL (ref 8–22)
CALCIUM ALBUM COR SERPL-MCNC: 9.2 MG/DL (ref 8.5–10.5)
CALCIUM SERPL-MCNC: 9.1 MG/DL (ref 8.5–10.5)
CHLORIDE SERPL-SCNC: 103 MMOL/L (ref 96–112)
CO2 SERPL-SCNC: 21 MMOL/L (ref 20–33)
CREAT SERPL-MCNC: 0.94 MG/DL (ref 0.5–1.4)
EOSINOPHIL # BLD AUTO: 0 K/UL (ref 0–0.51)
EOSINOPHIL NFR BLD: 0 % (ref 0–6.9)
ERYTHROCYTE [DISTWIDTH] IN BLOOD BY AUTOMATED COUNT: 49.1 FL (ref 35.9–50)
GFR SERPLBLD CREATININE-BSD FMLA CKD-EPI: 72 ML/MIN/1.73 M 2
GLOBULIN SER CALC-MCNC: 3 G/DL (ref 1.9–3.5)
GLUCOSE SERPL-MCNC: 160 MG/DL (ref 65–99)
HCT VFR BLD AUTO: 41.7 % (ref 37–47)
HGB BLD-MCNC: 13.4 G/DL (ref 12–16)
IMM GRANULOCYTES # BLD AUTO: 0.12 K/UL (ref 0–0.11)
IMM GRANULOCYTES NFR BLD AUTO: 0.7 % (ref 0–0.9)
LYMPHOCYTES # BLD AUTO: 0.74 K/UL (ref 1–4.8)
LYMPHOCYTES NFR BLD: 4.2 % (ref 22–41)
MCH RBC QN AUTO: 29.1 PG (ref 27–33)
MCHC RBC AUTO-ENTMCNC: 32.1 G/DL (ref 32.2–35.5)
MCV RBC AUTO: 90.5 FL (ref 81.4–97.8)
MONOCYTES # BLD AUTO: 0.29 K/UL (ref 0–0.85)
MONOCYTES NFR BLD AUTO: 1.6 % (ref 0–13.4)
NEUTROPHILS # BLD AUTO: 16.49 K/UL (ref 1.82–7.42)
NEUTROPHILS NFR BLD: 93.4 % (ref 44–72)
NRBC # BLD AUTO: 0 K/UL
NRBC BLD-RTO: 0 /100 WBC (ref 0–0.2)
PLATELET # BLD AUTO: 285 K/UL (ref 164–446)
PMV BLD AUTO: 9.9 FL (ref 9–12.9)
POTASSIUM SERPL-SCNC: 4.3 MMOL/L (ref 3.6–5.5)
PROT SERPL-MCNC: 6.9 G/DL (ref 6–8.2)
RBC # BLD AUTO: 4.61 M/UL (ref 4.2–5.4)
SODIUM SERPL-SCNC: 137 MMOL/L (ref 135–145)
WBC # BLD AUTO: 17.7 K/UL (ref 4.8–10.8)

## 2025-02-17 PROCEDURE — 770001 HCHG ROOM/CARE - MED/SURG/GYN PRIV*

## 2025-02-17 PROCEDURE — 700102 HCHG RX REV CODE 250 W/ 637 OVERRIDE(OP): Performed by: STUDENT IN AN ORGANIZED HEALTH CARE EDUCATION/TRAINING PROGRAM

## 2025-02-17 PROCEDURE — 94640 AIRWAY INHALATION TREATMENT: CPT

## 2025-02-17 PROCEDURE — 700111 HCHG RX REV CODE 636 W/ 250 OVERRIDE (IP): Mod: JZ | Performed by: STUDENT IN AN ORGANIZED HEALTH CARE EDUCATION/TRAINING PROGRAM

## 2025-02-17 PROCEDURE — 80053 COMPREHEN METABOLIC PANEL: CPT

## 2025-02-17 PROCEDURE — 700101 HCHG RX REV CODE 250

## 2025-02-17 PROCEDURE — 700102 HCHG RX REV CODE 250 W/ 637 OVERRIDE(OP): Performed by: INTERNAL MEDICINE

## 2025-02-17 PROCEDURE — 94669 MECHANICAL CHEST WALL OSCILL: CPT

## 2025-02-17 PROCEDURE — 85025 COMPLETE CBC W/AUTO DIFF WBC: CPT

## 2025-02-17 PROCEDURE — A9270 NON-COVERED ITEM OR SERVICE: HCPCS | Performed by: INTERNAL MEDICINE

## 2025-02-17 PROCEDURE — A9270 NON-COVERED ITEM OR SERVICE: HCPCS | Performed by: STUDENT IN AN ORGANIZED HEALTH CARE EDUCATION/TRAINING PROGRAM

## 2025-02-17 PROCEDURE — 99233 SBSQ HOSP IP/OBS HIGH 50: CPT | Mod: GC | Performed by: INTERNAL MEDICINE

## 2025-02-17 RX ORDER — FAMOTIDINE 20 MG/1
20 TABLET, FILM COATED ORAL 2 TIMES DAILY
Status: DISCONTINUED | OUTPATIENT
Start: 2025-02-17 | End: 2025-02-18 | Stop reason: HOSPADM

## 2025-02-17 RX ORDER — IPRATROPIUM BROMIDE AND ALBUTEROL SULFATE 2.5; .5 MG/3ML; MG/3ML
3 SOLUTION RESPIRATORY (INHALATION)
Status: DISCONTINUED | OUTPATIENT
Start: 2025-02-17 | End: 2025-02-18

## 2025-02-17 RX ORDER — ALBUTEROL SULFATE 5 MG/ML
2.5 SOLUTION RESPIRATORY (INHALATION)
Status: DISCONTINUED | OUTPATIENT
Start: 2025-02-17 | End: 2025-02-18 | Stop reason: HOSPADM

## 2025-02-17 RX ADMIN — Medication 100 MG: at 04:53

## 2025-02-17 RX ADMIN — THERA TABS 1 TABLET: TAB at 04:53

## 2025-02-17 RX ADMIN — CEFUROXIME AXETIL 250 MG: 250 TABLET ORAL at 17:28

## 2025-02-17 RX ADMIN — FAMOTIDINE 20 MG: 20 TABLET, FILM COATED ORAL at 17:28

## 2025-02-17 RX ADMIN — MOMETASONE FUROATE AND FORMOTEROL FUMARATE DIHYDRATE 2 PUFF: 200; 5 AEROSOL RESPIRATORY (INHALATION) at 19:08

## 2025-02-17 RX ADMIN — FAMOTIDINE 20 MG: 10 INJECTION, SOLUTION INTRAVENOUS at 04:53

## 2025-02-17 RX ADMIN — GUAIFENESIN SYRUP AND DEXTROMETHORPHAN 10 ML: 100; 10 SYRUP ORAL at 09:01

## 2025-02-17 RX ADMIN — IPRATROPIUM BROMIDE AND ALBUTEROL SULFATE 3 ML: .5; 2.5 SOLUTION RESPIRATORY (INHALATION) at 19:08

## 2025-02-17 RX ADMIN — METHYLPREDNISOLONE SODIUM SUCCINATE 40 MG: 40 INJECTION, POWDER, FOR SOLUTION INTRAMUSCULAR; INTRAVENOUS at 04:53

## 2025-02-17 RX ADMIN — IPRATROPIUM BROMIDE AND ALBUTEROL SULFATE 3 ML: .5; 2.5 SOLUTION RESPIRATORY (INHALATION) at 14:40

## 2025-02-17 RX ADMIN — ENOXAPARIN SODIUM 40 MG: 100 INJECTION SUBCUTANEOUS at 17:27

## 2025-02-17 RX ADMIN — TIOTROPIUM BROMIDE INHALATION SPRAY 5 MCG: 3.12 SPRAY, METERED RESPIRATORY (INHALATION) at 08:00

## 2025-02-17 RX ADMIN — MONTELUKAST 10 MG: 10 TABLET, FILM COATED ORAL at 21:31

## 2025-02-17 RX ADMIN — SENNOSIDES AND DOCUSATE SODIUM 2 TABLET: 50; 8.6 TABLET ORAL at 17:28

## 2025-02-17 RX ADMIN — AZITHROMYCIN DIHYDRATE 500 MG: 250 TABLET ORAL at 17:28

## 2025-02-17 RX ADMIN — MOMETASONE FUROATE AND FORMOTEROL FUMARATE DIHYDRATE 2 PUFF: 200; 5 AEROSOL RESPIRATORY (INHALATION) at 08:33

## 2025-02-17 RX ADMIN — METHYLPREDNISOLONE SODIUM SUCCINATE 40 MG: 40 INJECTION, POWDER, FOR SOLUTION INTRAMUSCULAR; INTRAVENOUS at 21:31

## 2025-02-17 RX ADMIN — CEFUROXIME AXETIL 250 MG: 250 TABLET ORAL at 04:53

## 2025-02-17 RX ADMIN — METHYLPREDNISOLONE SODIUM SUCCINATE 40 MG: 40 INJECTION, POWDER, FOR SOLUTION INTRAMUSCULAR; INTRAVENOUS at 13:55

## 2025-02-17 RX ADMIN — FOLIC ACID 1 MG: 1 TABLET ORAL at 04:53

## 2025-02-17 ASSESSMENT — ENCOUNTER SYMPTOMS
NECK PAIN: 0
HEADACHES: 0
HEARTBURN: 0
NAUSEA: 0
CONSTIPATION: 0
WHEEZING: 0
EYE DISCHARGE: 0
FEVER: 0
COUGH: 1
DIARRHEA: 0
DIZZINESS: 0
SHORTNESS OF BREATH: 0
CHILLS: 0
PALPITATIONS: 0
VOMITING: 0
BACK PAIN: 0
BRUISES/BLEEDS EASILY: 0
DEPRESSION: 0
ABDOMINAL PAIN: 0
ORTHOPNEA: 0
EYE PAIN: 0
MYALGIAS: 1

## 2025-02-17 ASSESSMENT — PATIENT HEALTH QUESTIONNAIRE - PHQ9
2. FEELING DOWN, DEPRESSED, IRRITABLE, OR HOPELESS: NOT AT ALL
SUM OF ALL RESPONSES TO PHQ9 QUESTIONS 1 AND 2: 0
1. LITTLE INTEREST OR PLEASURE IN DOING THINGS: NOT AT ALL

## 2025-02-17 ASSESSMENT — PAIN DESCRIPTION - PAIN TYPE: TYPE: ACUTE PAIN

## 2025-02-17 NOTE — CARE PLAN
The patient is Stable - Low risk of patient condition declining or worsening    Shift Goals  Clinical Goals: Walk study, dc  Patient Goals: Rest  Family Goals: KRISHNA    Progress made toward(s) clinical / shift goals:        Problem: Risk for Infection - COPD  Goal: Patient will remain free from signs and symptoms of infection  Description: Target End Date:  Prior to discharge or change in level of care    1.  Infection prevention measures  2.  Instruct patient regarding signs and symptoms of infection  3.  Review the importance of breathing exercises, effective cough, frequent position changes, and adequate fluid intake  4.  Recommend rinsing mouth with water and spitting, not swallowing, or use of a spacer on the mouthpiece of inhaled corticosteroids  Outcome: Progressing     Problem: Self Care  Goal: Patient will have the ability to perform ADLs independently or with assistance (bathe, groom, dress, toilet and feed)  Description: Target End Date:  Prior to discharge or change in level of care    Document on ADL flowsheet    1.  Assess the capability and level of deficiency to perform ADLs  2.  Encourage family/care giver involvement  3.  Provide assistive devices  4.  Consider PT/OT evaluations  5.  Maintain support, give positive feedback, encourage self-care allowing extra time and verbal cuing as needed  6.  Avoid doing something for patients they can do themselves, but provide assistance as needed  7.  Assist in anticipating/planning individual needs  8.  Collaborate with Case Management and  to meet discharge needs  Outcome: Progressing       Patient is not progressing towards the following goals:

## 2025-02-17 NOTE — CARE PLAN
The patient is Stable - Low risk of patient condition declining or worsening    Shift Goals  Clinical Goals: resp status, possible dc  Patient Goals: rest  Family Goals: charlette    Progress made toward(s) clinical / shift goals:    Problem: Knowledge Deficit - Standard  Goal: Patient and family/care givers will demonstrate understanding of plan of care, disease process/condition, diagnostic tests and medications  Description: Target End Date:  1-3 days or as soon as patient condition allows    Document in Patient Education    1.  Patient and family/caregiver oriented to unit, equipment, visitation policy and means for communicating concern  2.  Complete/review Learning Assessment  3.  Assess knowledge level of disease process/condition, treatment plan, diagnostic tests and medications  4.  Explain disease process/condition, treatment plan, diagnostic tests and medications  Outcome: Progressing     Problem: Knowledge Deficit - COPD  Goal: Patient/significant other demonstrates understanding of disease process, utilization of the Action Plan, medications and discharge instruction  Description: Target End Date:  1-3 days or as soon as patient condition allows    Document in Patient Education    1.  Discuss the importance of medical follow-up care, periodic chest x-rays, sputum cultures  2.  Review worsening signs and symptoms of COPD flare and exacerbation and its management  3.  Discuss respiratory medications, side effects, adverse reactions  4.  Demonstrate technique for using a metered-dose inhaler (MDI) and utilization of a spacer  5.  Review the COPD Action Plan  6.  Instruct and reinforce the rationale for breathing exercises, coughing effectively, and general conditioning exercises  7.  Stress importance of oral care and dental hygiene  8.  Discuss the importance of avoiding people with active respiratory infections and need for routine influenza and pneumococcal vaccinations  9.  Discuss factors that may trigger  condition and encourage patient/significant other to explore ways to control these factors in and around the home and work setting  10. Review the harmful effects of smoking and advise cessation of smoking  11. Provide information about activity limitations and alternating activities with rest periods to prevent fatigue  12. Instruct asthmatic patient of use of peak flow meter, as appropriate  13. Review oxygen requirements and dosage, safe use of oxygen, and refer to the supplier as indicated  14. Educate patient/family/caregiver on the use of Nasal Intermittent Positive Pressure Ventilation (NIPPV) and possible adverse reactions  Outcome: Progressing     Problem: Impaired Gas Exchange  Goal: Patient will demonstrate improved ventilation and adequate oxygenation and participate in treatment regimen within the level of ability/situation.  Description: Target End Date:  Prior to discharge or change in level of care    1.   Assess/monitor rate/rhythm/depth of effort of respirations  2.   Assess oxygenation as ordered  3.   Administer/titrate oxygen as ordered  4.   Position patient for maximum ventilatory efficiency  5.   Turn, cough, and deep breathe  6.   Vital signs, pulse oximetry  7.   Assess color and body temperature  8.   Assess and monitor breath sounds  9.   Encourage deep-slow or pursed-lip breathing exercises  10. Monitor changes in the level of consciousness and mental status  11. Encourage expectoration of sputum; airway suctioning  12. Elevate the head of the bed and position patient for maximum ventilatory efficiency  13. Provide a calm, quiet environment  14. Limit patient's activity during the acute phase and have patient resume activity gradually and increase as individually tolerated  15. Evaluate sleep patterns and limit stimulants such as caffeine  16. Collaborate with RT to administer medications/treatments as ordered  Outcome: Progressing   Pt maintaining spo2 on RA

## 2025-02-17 NOTE — DISCHARGE PLANNING
Care Transition Team Assessment    In case of emergency, contact pt's friend, Elma, [phone]    RN CM met with pt at bedside for assessment. She lives with roommates in a H at address on [place] and her grandson lives with her. She is independent with ADL's, drives self, and has home oxygen from [employer] that she was discharged with a few months ago. She only wears it as needed at night, but has tanks. 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 Her insurance has scheduled her an apt to establish with a PCP for next week. She uses [facility] in [place]. She has a ride home at discharge.    Information Source  Orientation Level: Oriented X4  Information Given By: Patient  Who is responsible for making decisions for patient? : Patient    Readmission Evaluation  Is this a readmission?: No    Elopement Risk  Legal Hold: No  Ambulatory or Self Mobile in Wheelchair: Yes  Disoriented: No  Psychiatric Symptoms: None  History of Wandering: No  Elopement this Admit: No  Vocalizing Wanting to Leave: No  Displays Behaviors, Body Language Wanting to Leave: No-Not at Risk for Elopement  Elopement Risk: Not at Risk for Elopement    Interdisciplinary Discharge Planning  Lives with - Patient's Self Care Capacity: Related Adult  Patient or legal guardian wants to designate a caregiver: No  Support Systems: Friends / Neighbors, Family Member(s)  Housing / Facility: Mobile Home  Name of Care Facility: n/a  Able to Return to Previous ADL's: Yes  Mobility Issues: No  Prior Services: None    Discharge Preparedness  What is your plan after discharge?: Home with help  What are your discharge supports?: Other (comment) (friends, neighbors)  Prior Functional Level: Ambulatory, Drives Self, Independent with Activities of Daily Living    Functional Assesment  Prior Functional Level: Ambulatory, Drives Self, Independent with Activities of Daily  Living    Finances  Financial Barriers to Discharge: No  Prescription Coverage: Yes    Vision / Hearing Impairment  Vision Impairment : Yes  Right Eye Vision: (P) Wears Glasses  Left Eye Vision: (P) Wears Glasses  Hearing Impairment : No    Domestic Abuse  Have you ever been the victim of abuse or violence?: No  Possible Abuse/Neglect Reported to:: Not Applicable    Anticipated Discharge Information  Discharge Disposition: Discharged to home/self care (01)

## 2025-02-17 NOTE — PROGRESS NOTES
[facility] Internal Medicine Daily Progress Note    Date of Service  2/17/2025    [facility] Team: [facility] IM Blue Team   Attending: Rajesh Edmonds M.d.  Senior Resident: Dr. Vipul Sifuentes  Intern:  Dr. Pineda  Contact Number: [phone]    Chief Complaint  Kenzie Huber is a 54 y.o. female admitted 2/15/2025 with acute shortness of breath for 1 day.    Hospital Course  Kenzie Huber is a 54 y.o. female with a history of COPD, tobacco abuse who was admitted on 2/15/2025 for evaluation acute onset shortness of breath with acute hypoxic respiratory failure. She reported to daily tobacco smoking, has cut back down from half a pack to 1 pack of cigarettes daily to currently only less than 10 cigarettes smoking daily. Denies illicit drug use. Reported to have acute onset of severe shortness of breath, therefore presented emergency room for evaluation. Upon ER arrival, hypotensive with BP of 82/40. She was initially placed on BiPAP, only tolerated for approximately 30 minutes but eventually switched back to nasal cannula support. Case was discussed with ICU attending, recommended admission to telemetry unit. She has received treatment with DuoNebs, albuterol, Solu-Medrol 125 mg IV, ceftriaxone and azithromycin. Patient admitted to medicine service for further evaluation and treatment. Her blood culture from /16 are negative (2/2) and she is on Ceftriaxone.    Interval Problem Update  02/17/2025: There were no acute events overnight.  The patient was seen at bedside, stated that her shortness of breath had significantly improved and is on room air.  Her UDS was positive for amphetamines, cannabinoids and benzodiazepines, she denies IVDU.  Her BNP was slightly elevated at admission, troponins were within normal range and x-ray was negative for pulmonary edema, cardiac cause of the acute hypoxic respiratory failure is less likely.   Given her single positive blood culture, a repeat set of blood cultures negative.   We will watch for  24 more hours on current antibiotics and then discharge on Cefuroxime to complete total of 7 days of antibiotics.     I have discussed this patient's plan of care and discharge plan at IDT rounds today with Case Management, Nursing, Nursing leadership, and other members of the IDT team.    Consultants/Specialty  None    Code Status  Full Code    Disposition  The patient is not medically cleared for discharge to home or a post-acute facility.      I have placed the appropriate orders for post-discharge needs.    Review of Systems  Review of Systems   Constitutional:  Negative for chills, fever and malaise/fatigue.   HENT:  Negative for ear discharge and ear pain.    Eyes:  Negative for pain and discharge.   Respiratory:  Positive for cough. Negative for shortness of breath and wheezing.    Cardiovascular:  Negative for chest pain, palpitations, orthopnea and leg swelling.   Gastrointestinal:  Negative for abdominal pain, constipation, diarrhea, heartburn, nausea and vomiting.   Genitourinary:  Negative for dysuria, frequency and urgency.   Musculoskeletal:  Positive for myalgias. Negative for back pain and neck pain.   Skin:  Negative for rash.   Neurological:  Negative for dizziness and headaches.   Endo/Heme/Allergies:  Does not bruise/bleed easily.   Psychiatric/Behavioral:  Negative for depression and suicidal ideas.         Physical Exam  Temp:  [36.2 °C (97.2 °F)-37.1 °C (98.8 °F)] 37.1 °C (98.8 °F)  Pulse:  [] 82  Resp:  [16-18] 16  BP: ()/(65-79) 117/79  SpO2:  [92 %-94 %] 92 %    Physical Exam  Constitutional:       General: She is not in acute distress.     Appearance: Normal appearance. She is not ill-appearing.   HENT:      Head: Normocephalic and atraumatic.      Right Ear: Tympanic membrane normal.      Left Ear: Tympanic membrane normal.      Nose: Nose normal. No congestion or rhinorrhea.      Mouth/Throat:      Mouth: Mucous membranes are moist.      Pharynx: No oropharyngeal exudate.    Eyes:      Extraocular Movements: Extraocular movements intact.      Pupils: Pupils are equal, round, and reactive to light.   Cardiovascular:      Rate and Rhythm: Normal rate and regular rhythm.      Pulses: Normal pulses.      Heart sounds: No murmur heard.     No gallop.   Pulmonary:      Effort: Pulmonary effort is normal. No respiratory distress.      Breath sounds: Wheezing present. No rales.   Abdominal:      General: Abdomen is flat. Bowel sounds are normal. There is no distension.      Palpations: Abdomen is soft.      Tenderness: There is no abdominal tenderness. There is no guarding.   Musculoskeletal:         General: No swelling or deformity. Normal range of motion.      Cervical back: Normal range of motion. No rigidity.      Right lower leg: No edema.      Left lower leg: No edema.   Lymphadenopathy:      Cervical: No cervical adenopathy.   Skin:     General: Skin is warm.      Coloration: Skin is not jaundiced.   Neurological:      General: No focal deficit present.      Mental Status: She is alert and oriented to person, place, and time.   Psychiatric:         Mood and Affect: Mood normal.         Behavior: Behavior normal.         Fluids    Intake/Output Summary (Last 24 hours) at 2/17/2025 1306  Last data filed at 2/17/2025 1200  Gross per 24 hour   Intake 1362.5 ml   Output 0 ml   Net 1362.5 ml       Laboratory  Recent Labs     02/15/25  1947 02/16/25  0819 02/17/25  0144   WBC 6.6 5.2 17.7*   RBC 5.47* 4.80 4.61   HEMOGLOBIN 16.1* 14.3 13.4   HEMATOCRIT 48.9* 43.2 41.7   MCV 89.4 90.0 90.5   MCH 29.4 29.8 29.1   MCHC 32.9 33.1 32.1*   RDW 48.2 48.4 49.1   PLATELETCT 328 259 285   MPV 9.9 9.9 9.9     Recent Labs     02/15/25  1947 02/16/25  0819 02/17/25  0144   SODIUM 141 137 137   POTASSIUM 4.6 4.2 4.3   CHLORIDE 107 103 103   CO2 22 20 21   GLUCOSE 83 167* 160*   BUN 16 13 22   CREATININE 0.84 0.79 0.94   CALCIUM 9.5 9.2 9.1     Recent Labs     02/15/25  2355   INR 1.03                Imaging  DX-CHEST-PORTABLE (1 VIEW)   Final Result      No evidence of acute cardiopulmonary process.      DX-CHEST-PORTABLE (1 VIEW)   Final Result      1.  No overt failure or pneumonia.   2.  Likely COPD.           Assessment/Plan  Problem Representation:    * Acute bronchitis with chronic obstructive pulmonary disease (COPD) (HCC)- (present on admission)  Assessment & Plan  Smoking cessation strongly advised  Pulmonary hygiene  -DuoNebs, Dulera, Spiriva, montelukast  -PEP therapy  -Solu-Medrol 125 mg IV x1  -Continue Solu-Medrol 40 mg IV every 8 hours  -Wean off Solu-Medrol as tolerated  -Antibiotic: Ceftriaxone, azithromycin  -Sputum culture pending  -Wean off oxygen support as tolerated  -RT protocol    Hypoglycemia  Assessment & Plan  Oral dietary intake as tolerated  Hypoglycemia protocol    Mild protein-calorie malnutrition (HCC)  Assessment & Plan  Ensure  Dietary intake as tolerated  Body mass index is 20.56 kg/m².  Nutrition eval    Dyspepsia  Assessment & Plan  IV Pepcid for now    Hypotension  Assessment & Plan  Improved/resolved  Check cortisol level ->wnl  IVF    Tobacco abuse counseling  Assessment & Plan  Smoking cessation counseling provided  Nicotine gum ordered  She stated she is unable to tolerate nicotine patch    Positive urine drug screen  Assessment & Plan  Denies illicit drug use, other than cannabis use  However, tested positive for methamphetamine  Multivitamins  Cessation counseling    Bacteremia  Assessment & Plan  True bacteremia versus contamination  1 out of 2 positive blood culture positive at this time  --Await final result    Already on antibiotics (ceftriaxone, azithromycin) as noted in COPD exacerbation  She denies IVDA  -Repeat blood cultures ordered 02/16, negative    Acute respiratory failure with hypoxia (HCC)  Assessment & Plan  Initially required BiPAP for 30 minutes  Weaned back down to 2 L nasal cannula support-continue to wean off as tolerated  She tested negative  for COVID/influenza/RSV  -COPD exacerbation being treated  - Cardiac cause of acute hypoxic respiratory failure is unlikely, TTE canceled           VTE prophylaxis: enoxaparin ppx    I have performed a physical exam and reviewed and updated ROS and Plan today (2/17/2025). In review of yesterday's note (2/16/2025), there are no changes except as documented above.

## 2025-02-18 ENCOUNTER — PHARMACY VISIT (OUTPATIENT)
Dept: PHARMACY | Facility: MEDICAL CENTER | Age: 55
End: 2025-02-18
Payer: COMMERCIAL

## 2025-02-18 VITALS
DIASTOLIC BLOOD PRESSURE: 71 MMHG | TEMPERATURE: 97.3 F | RESPIRATION RATE: 18 BRPM | BODY MASS INDEX: 18.07 KG/M2 | HEIGHT: 66 IN | WEIGHT: 112.43 LBS | OXYGEN SATURATION: 91 % | SYSTOLIC BLOOD PRESSURE: 106 MMHG | HEART RATE: 62 BPM

## 2025-02-18 PROBLEM — E44.1 MILD PROTEIN-CALORIE MALNUTRITION (HCC): Status: RESOLVED | Noted: 2025-02-16 | Resolved: 2025-02-18

## 2025-02-18 LAB
ALBUMIN SERPL BCP-MCNC: 4 G/DL (ref 3.2–4.9)
ALBUMIN/GLOB SERPL: 1.3 G/DL
ALP SERPL-CCNC: 88 U/L (ref 30–99)
ALT SERPL-CCNC: 18 U/L (ref 2–50)
ANION GAP SERPL CALC-SCNC: 11 MMOL/L (ref 7–16)
AST SERPL-CCNC: 14 U/L (ref 12–45)
BACTERIA UR CULT: NORMAL
BASOPHILS # BLD AUTO: 0.1 % (ref 0–1.8)
BASOPHILS # BLD: 0.01 K/UL (ref 0–0.12)
BILIRUB SERPL-MCNC: <0.2 MG/DL (ref 0.1–1.5)
BUN SERPL-MCNC: 26 MG/DL (ref 8–22)
CALCIUM ALBUM COR SERPL-MCNC: 9 MG/DL (ref 8.5–10.5)
CALCIUM SERPL-MCNC: 9 MG/DL (ref 8.5–10.5)
CHLORIDE SERPL-SCNC: 104 MMOL/L (ref 96–112)
CO2 SERPL-SCNC: 20 MMOL/L (ref 20–33)
CREAT SERPL-MCNC: 0.75 MG/DL (ref 0.5–1.4)
EOSINOPHIL # BLD AUTO: 0 K/UL (ref 0–0.51)
EOSINOPHIL NFR BLD: 0 % (ref 0–6.9)
ERYTHROCYTE [DISTWIDTH] IN BLOOD BY AUTOMATED COUNT: 48.9 FL (ref 35.9–50)
GFR SERPLBLD CREATININE-BSD FMLA CKD-EPI: 95 ML/MIN/1.73 M 2
GLOBULIN SER CALC-MCNC: 3.1 G/DL (ref 1.9–3.5)
GLUCOSE SERPL-MCNC: 130 MG/DL (ref 65–99)
HCT VFR BLD AUTO: 42.2 % (ref 37–47)
HGB BLD-MCNC: 14 G/DL (ref 12–16)
IMM GRANULOCYTES # BLD AUTO: 0.07 K/UL (ref 0–0.11)
IMM GRANULOCYTES NFR BLD AUTO: 0.7 % (ref 0–0.9)
LYMPHOCYTES # BLD AUTO: 0.97 K/UL (ref 1–4.8)
LYMPHOCYTES NFR BLD: 9.9 % (ref 22–41)
MCH RBC QN AUTO: 29.6 PG (ref 27–33)
MCHC RBC AUTO-ENTMCNC: 33.2 G/DL (ref 32.2–35.5)
MCV RBC AUTO: 89.2 FL (ref 81.4–97.8)
MONOCYTES # BLD AUTO: 0.21 K/UL (ref 0–0.85)
MONOCYTES NFR BLD AUTO: 2.2 % (ref 0–13.4)
NEUTROPHILS # BLD AUTO: 8.5 K/UL (ref 1.82–7.42)
NEUTROPHILS NFR BLD: 87.1 % (ref 44–72)
NRBC # BLD AUTO: 0 K/UL
NRBC BLD-RTO: 0 /100 WBC (ref 0–0.2)
PLATELET # BLD AUTO: 301 K/UL (ref 164–446)
PMV BLD AUTO: 9.9 FL (ref 9–12.9)
POTASSIUM SERPL-SCNC: 4.6 MMOL/L (ref 3.6–5.5)
PROT SERPL-MCNC: 7.1 G/DL (ref 6–8.2)
RBC # BLD AUTO: 4.73 M/UL (ref 4.2–5.4)
SIGNIFICANT IND 70042: NORMAL
SITE SITE: NORMAL
SODIUM SERPL-SCNC: 135 MMOL/L (ref 135–145)
SOURCE SOURCE: NORMAL
WBC # BLD AUTO: 9.8 K/UL (ref 4.8–10.8)

## 2025-02-18 PROCEDURE — 700102 HCHG RX REV CODE 250 W/ 637 OVERRIDE(OP): Performed by: STUDENT IN AN ORGANIZED HEALTH CARE EDUCATION/TRAINING PROGRAM

## 2025-02-18 PROCEDURE — A9270 NON-COVERED ITEM OR SERVICE: HCPCS | Performed by: INTERNAL MEDICINE

## 2025-02-18 PROCEDURE — 85025 COMPLETE CBC W/AUTO DIFF WBC: CPT

## 2025-02-18 PROCEDURE — 80053 COMPREHEN METABOLIC PANEL: CPT

## 2025-02-18 PROCEDURE — 700111 HCHG RX REV CODE 636 W/ 250 OVERRIDE (IP): Mod: JZ | Performed by: STUDENT IN AN ORGANIZED HEALTH CARE EDUCATION/TRAINING PROGRAM

## 2025-02-18 PROCEDURE — 700102 HCHG RX REV CODE 250 W/ 637 OVERRIDE(OP): Performed by: INTERNAL MEDICINE

## 2025-02-18 PROCEDURE — A9270 NON-COVERED ITEM OR SERVICE: HCPCS | Performed by: STUDENT IN AN ORGANIZED HEALTH CARE EDUCATION/TRAINING PROGRAM

## 2025-02-18 RX ORDER — UMECLIDINIUM BROMIDE AND VILANTEROL TRIFENATATE 62.5; 25 UG/1; UG/1
1 POWDER RESPIRATORY (INHALATION) DAILY
Qty: 60 EACH | Refills: 0 | Status: SHIPPED | OUTPATIENT
Start: 2025-02-18

## 2025-02-18 RX ORDER — CEFUROXIME AXETIL 250 MG/1
250 TABLET ORAL EVERY 12 HOURS
Qty: 2 TABLET | Refills: 0 | Status: ACTIVE | OUTPATIENT
Start: 2025-02-18 | End: 2025-02-19

## 2025-02-18 RX ADMIN — METHYLPREDNISOLONE SODIUM SUCCINATE 40 MG: 40 INJECTION, POWDER, FOR SOLUTION INTRAMUSCULAR; INTRAVENOUS at 05:35

## 2025-02-18 RX ADMIN — FOLIC ACID 1 MG: 1 TABLET ORAL at 05:30

## 2025-02-18 RX ADMIN — Medication 100 MG: at 05:30

## 2025-02-18 RX ADMIN — CEFUROXIME AXETIL 250 MG: 250 TABLET ORAL at 05:30

## 2025-02-18 RX ADMIN — FAMOTIDINE 20 MG: 20 TABLET, FILM COATED ORAL at 05:30

## 2025-02-18 RX ADMIN — THERA TABS 1 TABLET: TAB at 05:30

## 2025-02-18 ASSESSMENT — PATIENT HEALTH QUESTIONNAIRE - PHQ9
2. FEELING DOWN, DEPRESSED, IRRITABLE, OR HOPELESS: NOT AT ALL
1. LITTLE INTEREST OR PLEASURE IN DOING THINGS: NOT AT ALL
SUM OF ALL RESPONSES TO PHQ9 QUESTIONS 1 AND 2: 0

## 2025-02-18 ASSESSMENT — PAIN DESCRIPTION - PAIN TYPE: TYPE: ACUTE PAIN

## 2025-02-18 NOTE — CARE PLAN
The patient is Stable - Low risk of patient condition declining or worsening    Shift Goals  Clinical Goals: Patient to discharge  Patient Goals: To discharge  Family Goals: KRISHNA    Progress made toward(s) clinical / shift goals:        Problem: Self Care  Goal: Patient will have the ability to perform ADLs independently or with assistance (bathe, groom, dress, toilet and feed)  Description: Target End Date:  Prior to discharge or change in level of care    Document on ADL flowsheet    1.  Assess the capability and level of deficiency to perform ADLs  2.  Encourage family/care giver involvement  3.  Provide assistive devices  4.  Consider PT/OT evaluations  5.  Maintain support, give positive feedback, encourage self-care allowing extra time and verbal cuing as needed  6.  Avoid doing something for patients they can do themselves, but provide assistance as needed  7.  Assist in anticipating/planning individual needs  8.  Collaborate with Case Management and  to meet discharge needs  Outcome: Progressing     Problem: Respiratory  Goal: Patient will achieve/maintain optimum respiratory ventilation and gas exchange  Description: Target End Date:  Prior to discharge or change in level of care    Document on Assessment flowsheet    1.  Assess and monitor rate, rhythm, depth and effort of respiration  2.  Breath sounds assessed qshift and/or as needed  3.  Assess O2 saturation, administer/titrate oxygen as ordered  4.  Position patient for maximum ventilatory efficiency  5.  Turn, cough, and deep breath with splinting to improve effectiveness  6.  Collaborate with RT to administer medication/treatments per order  7.  Encourage use of incentive spirometer and encourage patient to cough after use and utilize splinting techniques if applicable  8.  Airway suctioning  9.  Monitor sputum production for changes in color, consistency and frequency  10. Perform frequent oral hygiene  11. Alternate physical activity  with rest periods  Outcome: Progressing

## 2025-02-18 NOTE — DISCHARGE INSTRUCTIONS
-Complete your course of antibiotics  -Prescription written for inhaler  -Follow-up with your PCP  -Follow-up with pulmonologist

## 2025-02-18 NOTE — DISCHARGE SUMMARY
[facility] Internal Medicine Discharge Summary    Attending: Best Mills D.o.  Senior Resident: Dr. Sifuentes  Intern:  Dr. Pineda  Contact Number: [phone]    CHIEF COMPLAINT ON ADMISSION  Chief Complaint   Patient presents with    Shortness of Breath     Pt brought in from home complaints of sob- h/o COPD       Reason for Admission  Shortness of breath     Admission Date  2/15/2025    CODE STATUS  Full Code    HPI & HOSPITAL COURSE  Kenzie Huber is a 54 y.o. female with a history of COPD, tobacco abuse who was admitted on 2/15/2025 for evaluation acute onset shortness of breath with acute hypoxic respiratory failure.  She reported to daily tobacco smoking, has cut back down from half a pack to 1 pack of cigarettes daily to currently only less than 10 cigarettes smoking daily.  Denies illicit drug use.  Reported to have acute onset of severe shortness of breath, therefore presented emergency room for evaluation.  Upon ER arrival, hypotensive with BP of 82/40.  She was initially placed on BiPAP, only tolerated for approximately 30 minutes but eventually switched back to nasal cannula support.  Case was discussed with ICU attending, recommended admission to telemetry unit.  She received treatment with DuoNebs, albuterol, Solu-Medrol 125 mg IV, ceftriaxone and azithromycin.  Admission requested by ERP.  Upon admission, patient was monitored closely with pulse oximetry and cardiac monitoring, she was transition to cefuroxime, she had concerning blood culture initially, but repeat blood culture remained negative to date, her oxygen requirement slowly decreased over time, she was given full course of steroids, RT protocol for for lung disease.  Other problems including hypoglycemia, dyspepsia, hypotension, and tobacco use are manageable and patient is well.  By 2/18/2025, patient's blood cultures had remained without any growth, she had symptomatically improved, she was on room air, and she was found fit for  discharge home with close outpatient follow-up including refills of inhaler, and recommendation that she establish with primary care physician upon discharge.    Therefore, she is discharged in good and stable condition to home with close outpatient follow-up.    The patient met 2-midnight criteria for an inpatient stay at the time of discharge.    Discharge Date  2/18/25    Physical Exam on Day of Discharge  Physical Exam  Constitutional:       General: She is not in acute distress.     Appearance: Normal appearance. She is not ill-appearing.   HENT:      Head: Normocephalic and atraumatic.      Right Ear: Tympanic membrane normal.      Left Ear: Tympanic membrane normal.      Nose: Nose normal. No congestion or rhinorrhea.      Mouth/Throat:      Mouth: Mucous membranes are moist.      Pharynx: No oropharyngeal exudate.   Eyes:      Extraocular Movements: Extraocular movements intact.      Pupils: Pupils are equal, round, and reactive to light.   Cardiovascular:      Rate and Rhythm: Normal rate and regular rhythm.      Pulses: Normal pulses.      Heart sounds: No murmur heard.     No gallop.   Pulmonary:      Effort: Pulmonary effort is normal. No respiratory distress.      Breath sounds: Wheezing present. No rales.   Abdominal:      General: Abdomen is flat. Bowel sounds are normal. There is no distension.      Palpations: Abdomen is soft.      Tenderness: There is no abdominal tenderness. There is no guarding.   Musculoskeletal:         General: No swelling or deformity. Normal range of motion.      Cervical back: Normal range of motion. No rigidity.      Right lower leg: No edema.      Left lower leg: No edema.   Lymphadenopathy:      Cervical: No cervical adenopathy.   Skin:     General: Skin is warm.      Coloration: Skin is not jaundiced.   Neurological:      General: No focal deficit present.      Mental Status: She is alert and oriented to person, place, and time.   Psychiatric:         Mood and Affect:  Mood normal.         Behavior: Behavior normal.         FOLLOW UP ITEMS POST DISCHARGE  -Complete course of antibiotics as prescribed  -Establish with PCP  -Consider repeat pulmonary function tests and subsequent pulmonology referral if indicated    DISCHARGE DIAGNOSES  Principal Problem:    Acute bronchitis with chronic obstructive pulmonary disease (COPD) (HCC) (POA: Yes)  Active Problems:    Acute respiratory failure with hypoxia (HCC) (POA: Unknown)    Bacteremia (POA: Unknown)    Positive urine drug screen (POA: Unknown)    Tobacco abuse counseling (POA: Unknown)    Hypotension (POA: Unknown)    Dyspepsia (POA: Unknown)    Hypoglycemia (POA: Unknown)  Resolved Problems:    Mild protein-calorie malnutrition (HCC) (POA: Unknown)      FOLLOW UP  Future Appointments   Date Time Provider Department Center   2/24/2025  1:00 PM Blake Hollis M.D. [facility]     No follow-up provider specified.    MEDICATIONS ON DISCHARGE     Medication List        START taking these medications        Instructions   Anoro Ellipta 62.5-25 MCG/ACT Aepb inhaler  Generic drug: umeclidinium-vilanterol   Inhale 1 Puff every day.  Dose: 1 Puff     cefUROXime 250 MG Tabs  Commonly known as: Ceftin   Take 1 Tablet by mouth every 12 hours for 1 day.  Dose: 250 mg            CONTINUE taking these medications        Instructions   albuterol 108 (90 Base) MCG/ACT Aers inhalation aerosol   Inhale 2 Puffs every four hours as needed for Shortness of Breath (cough due to bronchospasm/COPD).  Dose: 2 Puff              Allergies  Allergies   Allergen Reactions    Naproxen Anaphylaxis    Pcn [Penicillins] Rash     > 30 years ago       DIET  Orders Placed This Encounter   Procedures    Diet Order Diet: Regular     Standing Status:   Standing     Number of Occurrences:   1     Diet::   Regular [1]       ACTIVITY  As tolerated.  Weight bearing as tolerated    CONSULTATIONS  None    PROCEDURES  None    LABORATORY  Lab Results   Component Value Date     SODIUM 135 02/18/2025    POTASSIUM 4.6 02/18/2025    CHLORIDE 104 02/18/2025    CO2 20 02/18/2025    GLUCOSE 130 (H) 02/18/2025    BUN 26 (H) 02/18/2025    CREATININE 0.75 02/18/2025        Lab Results   Component Value Date    WBC 9.8 02/18/2025    HEMOGLOBIN 14.0 02/18/2025    HEMATOCRIT 42.2 02/18/2025    PLATELETCT 301 02/18/2025        Total time of the discharge process exceeds 56 minutes.

## 2025-02-18 NOTE — CARE PLAN
The patient is Stable - Low risk of patient condition declining or worsening    Shift Goals  Clinical Goals: Patient will report absence of SOB, monitor labs for culture results  Patient Goals: Rest/sleep  Family Goals: KRISHNA    Progress made toward(s) clinical / shift goals:    Problem: Knowledge Deficit - Standard  Goal: Patient and family/care givers will demonstrate understanding of plan of care, disease process/condition, diagnostic tests and medications  Outcome: Progressing  Note: Patient educated on prescribed medication, the need to report SOB symptoms, and the need to continue deep breathing exercises. pt verbalized understanding        Problem: Self Care  Goal: Patient will have the ability to perform ADLs independently or with assistance (bathe, groom, dress, toilet and feed)  Outcome: Progressing  Note: Patient demonstrating independence in room      Problem: Respiratory  Goal: Patient will achieve/maintain optimum respiratory ventilation and gas exchange  Outcome: Progressing  Note: Patient remained on RA while SPO2 above 90% throughout the night        Patient is not progressing towards the following goals: NA

## 2025-02-18 NOTE — DIETARY
"Nutrition Services: Initial Assessment     Day 3 of admit. Kenzie Huber is 54 y.o., female with admitting DX of Acute bronchitis with chronic obstructive pulmonary disease (COPD) ([facility]) [J44.0, J20.9].    Consult Received for: failure to thrive, BMI <19, and mild protein-calorie malnutrition diagnosis in problem list    Current Hospital Problems List:    Acute bronchitis with chronic obstructive pulmonary disease  Acute respiratory failure with hypoxia  Bacteremia  Dyspepsia  Hypoglycemia  Hypotension  Mild protein-calories malnutrition  Positive urine drug screen  Tobacco abuse counseling       Nutrition Assessment:      Height: 167.6 cm (5' 6\")  Weight: 51 kg (112 lb 7 oz)  Weight taken via: Stand Up Scale on 2/16  BMI Calculated: 18.15 kg/m2  BMI Classification: Underweight       Wt Readings from Last 5 Encounters:   02/16/25 51 kg (112 lb 7 oz)   12/20/24 58.1 kg (128 lb 1.4 oz)   08/09/23 58.1 kg (128 lb)   04/27/15 56.7 kg (125 lb)   04/05/15 54.4 kg (120 lb)   If weight of 58.1 kg on 12/20 is correct (is a stated weight) pt with a 16 lb (12.5%) weight loss x 3 months, which is clinically significant. Likely that all weights featured above are stated or estimated.    Objective:   Pertinent Medical Hx: COPD, tobacco abuse  Pertinent Labs: glucose 130, BUN 26  Pertinent Meds: Pepcid, BM protocol, MVI, thiamine, folic acid  Skin/Wounds: no wounds or edema noted  Food Allergies: NKFA  Last BM:  Not observed  02/16/25     Current Diet Order/Intake:   Regular diet  PO intake per ADLs hs been 50-75% x 1 meal and % x 3 meals.    Subjective:   Met with pt at bedside. Pt reported no recent changes in weight of appetite. Pt thin but not cachectic.     Nutrition Focused Physical Exam (NFPE)  Weight Loss: Possible 16 lb loss in the last 3 months.  Muscle Mass: Mild loss evident at temples  Subcutaneous Fat: Well Nourished  Fluid Accumulation: none  Reduced  Strength: N/A in acute care setting.    Nutrition " Diagnosis:      Based on RD assessment at this time, Patient does not meet criteria in congruence with ASPEN/[employer]y guidelines for malnutrition    Informed provider via Voalte.    Recommendations/Plan:   Encourage ongoing PO intake >50%  Continue to record PO intake as % in ADLs  Monitor weight    RD available PRN                                         ASPEN/AND CRITERIA FOR MALNUTRITION

## 2025-02-18 NOTE — PROGRESS NOTES
Patient arrived to discharge lounge.  Discussed discharge paperwork and medications with patient. Answered all questions. No home meds to return, M2B given to patient. Patient ambulated out with family, personal belongings in hand.

## 2025-02-18 NOTE — PROGRESS NOTES
Patient educated about discharge lounge protocol, patient verbalized understanding about teaching. Patients IV removed. Patients belongings present on admission were present at discharge. Patient explained discharge lounge would  her medications from the pharmacy. Patient denies any further questions. Patients family at bedside and will assist in transporting the patient home.

## 2025-02-20 ENCOUNTER — PATIENT MESSAGE (OUTPATIENT)
Dept: HEALTH INFORMATION MANAGEMENT | Facility: OTHER | Age: 55
End: 2025-02-20

## 2025-02-20 ENCOUNTER — TELEPHONE (OUTPATIENT)
Dept: VASCULAR LAB | Facility: MEDICAL CENTER | Age: 55
End: 2025-02-20
Payer: MEDICAID

## 2025-02-20 LAB
BACTERIA BLD CULT: NORMAL
SIGNIFICANT IND 70042: NORMAL
SITE SITE: NORMAL
SOURCE SOURCE: NORMAL

## 2025-02-20 NOTE — Clinical Note
REFERRAL APPROVAL NOTICE         Sent on February 20, 2025                   Kenzie Parksshamiguel  [address] [facility] [address]                   Dear Ms. Huber,    After a careful review of the medical information and benefit coverage, [facility] has processed your referral. See below for additional details.    If applicable, you must be actively enrolled with your insurance for coverage of the authorized service. If you have any questions regarding your coverage, please contact your insurance directly.    REFERRAL INFORMATION   Referral #:  [id number]  Referred-To Department    Referred-By Provider:  Smoking Cessation Program    Lyndon Bhagat M.D.   Pulmonary Lab Op [address] [id number] [phone] [address] [place] [id number] [phone]    Referral Start Date:  02/15/2025  Referral End Date:   02/15/2026             SCHEDULING  If you do not already have an appointment, please call [phone] to make an appointment.     MORE INFORMATION  If you do not already have a [employer] [facility] [employer] account, sign up at: [email or web address]  You can access your medical information, make appointments, see lab results, billing information, and more.  If you have questions regarding this referral, please contact  the [facility] Referrals department at:             [phone]. Monday - Friday 8:00AM - 5:00PM.     Sincerely,    [facility]

## 2025-02-20 NOTE — TELEPHONE ENCOUNTER
[facility]     Received smoking cessation referral from Dr. Bhagat on 2/15/25.     Called patient to schedule an appt to establish care. No answer. Unable to LVM.    Sent MCM.     Insurance: Medicaid (?)  PCP: None  Locations to be seen: [facility]     If no response by 3/15/25 (1 month from referral date) OR 2 no shows/cancellations, will remove from referral list and send FYI to referring provider    Blair Caldera, PharmD, BCACP  [facility]  Phone: [phone], Fax [phone]

## 2025-02-21 LAB
BACTERIA BLD CULT: NORMAL
BACTERIA BLD CULT: NORMAL
SIGNIFICANT IND 70042: NORMAL
SIGNIFICANT IND 70042: NORMAL
SITE SITE: NORMAL
SITE SITE: NORMAL
SOURCE SOURCE: NORMAL
SOURCE SOURCE: NORMAL

## 2025-02-24 ENCOUNTER — APPOINTMENT (OUTPATIENT)
Dept: MEDICAL GROUP | Facility: CLINIC | Age: 55
End: 2025-02-24
Payer: MEDICAID

## 2025-02-27 ENCOUNTER — TELEPHONE (OUTPATIENT)
Dept: VASCULAR LAB | Facility: MEDICAL CENTER | Age: 55
End: 2025-02-27
Payer: MEDICAID

## 2025-02-27 LAB
BACTERIA BLD CULT: ABNORMAL
SIGNIFICANT IND 70042: ABNORMAL
SITE SITE: ABNORMAL
SOURCE SOURCE: ABNORMAL

## 2025-02-27 NOTE — TELEPHONE ENCOUNTER
[facility]     Received smoking cessation referral from Dr. Bhagat on 2/15/25.     Called patient to schedule an appt to establish care. No answer. Unable to LVM.     2nd attempt     Insurance: Medicaid (?)  PCP: None  Locations to be seen: [facility]    If no response by 3/15/25 OR 2 no shows/cancellations, will remove from referral list    Lyndon Lino, PharmD  [facility]  Phone [phone]

## 2025-03-06 ENCOUNTER — TELEPHONE (OUTPATIENT)
Dept: VASCULAR LAB | Facility: MEDICAL CENTER | Age: 55
End: 2025-03-06

## 2025-03-06 ENCOUNTER — APPOINTMENT (OUTPATIENT)
Dept: MEDICAL GROUP | Facility: CLINIC | Age: 55
End: 2025-03-06
Payer: MEDICAID

## 2025-03-06 NOTE — TELEPHONE ENCOUNTER
[facility]     Received smoking cessation referral from Dr. Bhagat on 2/15/25.     Called patient to schedule an appt to establish care. No answer. Unable to LVM.    3rd call     Sent MCM.     Insurance: Medicaid   PCP: None  Locations to be seen: [facility]     If no response by 3/15/25 (1 month from referral date) OR 2 no shows/cancellations, will remove from referral list and send FYI to referring provider     Blair Caldera, PharmD, BCACP  [facility]  Phone: [phone], Fax [phone]

## 2025-07-16 ENCOUNTER — HOSPITAL ENCOUNTER (EMERGENCY)
Facility: MEDICAL CENTER | Age: 55
End: 2025-07-16
Attending: EMERGENCY MEDICINE
Payer: MEDICAID

## 2025-07-16 ENCOUNTER — PHARMACY VISIT (OUTPATIENT)
Dept: PHARMACY | Facility: MEDICAL CENTER | Age: 55
End: 2025-07-16
Payer: COMMERCIAL

## 2025-07-16 ENCOUNTER — APPOINTMENT (OUTPATIENT)
Dept: RADIOLOGY | Facility: MEDICAL CENTER | Age: 55
End: 2025-07-16
Attending: EMERGENCY MEDICINE
Payer: MEDICAID

## 2025-07-16 VITALS
WEIGHT: 109.79 LBS | DIASTOLIC BLOOD PRESSURE: 74 MMHG | HEART RATE: 63 BPM | OXYGEN SATURATION: 97 % | TEMPERATURE: 97.4 F | HEIGHT: 66 IN | RESPIRATION RATE: 16 BRPM | BODY MASS INDEX: 17.64 KG/M2 | SYSTOLIC BLOOD PRESSURE: 133 MMHG

## 2025-07-16 DIAGNOSIS — J44.1 ACUTE EXACERBATION OF CHRONIC OBSTRUCTIVE PULMONARY DISEASE (COPD) (HCC): Primary | ICD-10-CM

## 2025-07-16 LAB
ALBUMIN SERPL BCP-MCNC: 3.9 G/DL (ref 3.2–4.9)
ALBUMIN/GLOB SERPL: 1.5 G/DL
ALP SERPL-CCNC: 87 U/L (ref 30–99)
ALT SERPL-CCNC: 25 U/L (ref 2–50)
ANION GAP SERPL CALC-SCNC: 12 MMOL/L (ref 7–16)
AST SERPL-CCNC: 18 U/L (ref 12–45)
BASOPHILS # BLD AUTO: 1.4 % (ref 0–1.8)
BASOPHILS # BLD: 0.08 K/UL (ref 0–0.12)
BILIRUB SERPL-MCNC: 0.2 MG/DL (ref 0.1–1.5)
BUN SERPL-MCNC: 14 MG/DL (ref 8–22)
CALCIUM ALBUM COR SERPL-MCNC: 8.5 MG/DL (ref 8.5–10.5)
CALCIUM SERPL-MCNC: 8.4 MG/DL (ref 8.5–10.5)
CHLORIDE SERPL-SCNC: 109 MMOL/L (ref 96–112)
CO2 SERPL-SCNC: 21 MMOL/L (ref 20–33)
CREAT SERPL-MCNC: 0.74 MG/DL (ref 0.5–1.4)
EKG IMPRESSION: NORMAL
EOSINOPHIL # BLD AUTO: 0.59 K/UL (ref 0–0.51)
EOSINOPHIL NFR BLD: 10.5 % (ref 0–6.9)
ERYTHROCYTE [DISTWIDTH] IN BLOOD BY AUTOMATED COUNT: 47.8 FL (ref 35.9–50)
GFR SERPLBLD CREATININE-BSD FMLA CKD-EPI: 96 ML/MIN/1.73 M 2
GLOBULIN SER CALC-MCNC: 2.6 G/DL (ref 1.9–3.5)
GLUCOSE SERPL-MCNC: 96 MG/DL (ref 65–99)
HCT VFR BLD AUTO: 42.8 % (ref 37–47)
HGB BLD-MCNC: 14 G/DL (ref 12–16)
IMM GRANULOCYTES # BLD AUTO: 0.02 K/UL (ref 0–0.11)
IMM GRANULOCYTES NFR BLD AUTO: 0.4 % (ref 0–0.9)
LYMPHOCYTES # BLD AUTO: 1.9 K/UL (ref 1–4.8)
LYMPHOCYTES NFR BLD: 33.8 % (ref 22–41)
MAGNESIUM SERPL-MCNC: 1.9 MG/DL (ref 1.5–2.5)
MCH RBC QN AUTO: 30.5 PG (ref 27–33)
MCHC RBC AUTO-ENTMCNC: 32.7 G/DL (ref 32.2–35.5)
MCV RBC AUTO: 93.2 FL (ref 81.4–97.8)
MONOCYTES # BLD AUTO: 0.37 K/UL (ref 0–0.85)
MONOCYTES NFR BLD AUTO: 6.6 % (ref 0–13.4)
NEUTROPHILS # BLD AUTO: 2.66 K/UL (ref 1.82–7.42)
NEUTROPHILS NFR BLD: 47.3 % (ref 44–72)
NRBC # BLD AUTO: 0 K/UL
NRBC BLD-RTO: 0 /100 WBC (ref 0–0.2)
NT-PROBNP SERPL IA-MCNC: 332 PG/ML (ref 0–125)
PLATELET # BLD AUTO: 324 K/UL (ref 164–446)
PMV BLD AUTO: 10.4 FL (ref 9–12.9)
POTASSIUM SERPL-SCNC: 3.7 MMOL/L (ref 3.6–5.5)
PROT SERPL-MCNC: 6.5 G/DL (ref 6–8.2)
RBC # BLD AUTO: 4.59 M/UL (ref 4.2–5.4)
SODIUM SERPL-SCNC: 142 MMOL/L (ref 135–145)
TROPONIN T SERPL-MCNC: <6 NG/L (ref 6–19)
WBC # BLD AUTO: 5.6 K/UL (ref 4.8–10.8)

## 2025-07-16 PROCEDURE — 700101 HCHG RX REV CODE 250: Mod: UD | Performed by: EMERGENCY MEDICINE

## 2025-07-16 PROCEDURE — 94640 AIRWAY INHALATION TREATMENT: CPT

## 2025-07-16 PROCEDURE — 99284 EMERGENCY DEPT VISIT MOD MDM: CPT

## 2025-07-16 PROCEDURE — 71045 X-RAY EXAM CHEST 1 VIEW: CPT

## 2025-07-16 PROCEDURE — 93005 ELECTROCARDIOGRAM TRACING: CPT | Mod: TC

## 2025-07-16 PROCEDURE — 700111 HCHG RX REV CODE 636 W/ 250 OVERRIDE (IP): Mod: JZ,UD | Performed by: EMERGENCY MEDICINE

## 2025-07-16 PROCEDURE — 93005 ELECTROCARDIOGRAM TRACING: CPT | Mod: TC | Performed by: EMERGENCY MEDICINE

## 2025-07-16 PROCEDURE — 85025 COMPLETE CBC W/AUTO DIFF WBC: CPT

## 2025-07-16 PROCEDURE — 80053 COMPREHEN METABOLIC PANEL: CPT

## 2025-07-16 PROCEDURE — 83880 ASSAY OF NATRIURETIC PEPTIDE: CPT

## 2025-07-16 PROCEDURE — 96374 THER/PROPH/DIAG INJ IV PUSH: CPT

## 2025-07-16 PROCEDURE — 36415 COLL VENOUS BLD VENIPUNCTURE: CPT

## 2025-07-16 PROCEDURE — 84484 ASSAY OF TROPONIN QUANT: CPT

## 2025-07-16 PROCEDURE — 83735 ASSAY OF MAGNESIUM: CPT

## 2025-07-16 RX ORDER — UMECLIDINIUM BROMIDE AND VILANTEROL TRIFENATATE 62.5; 25 UG/1; UG/1
1 POWDER RESPIRATORY (INHALATION) DAILY
Qty: 60 EACH | Refills: 0 | Status: SHIPPED | OUTPATIENT
Start: 2025-07-16

## 2025-07-16 RX ORDER — IPRATROPIUM BROMIDE AND ALBUTEROL SULFATE 2.5; .5 MG/3ML; MG/3ML
3 SOLUTION RESPIRATORY (INHALATION)
Status: COMPLETED | OUTPATIENT
Start: 2025-07-16 | End: 2025-07-16

## 2025-07-16 RX ORDER — METHYLPREDNISOLONE SODIUM SUCCINATE 125 MG/2ML
62.5 INJECTION, POWDER, LYOPHILIZED, FOR SOLUTION INTRAMUSCULAR; INTRAVENOUS ONCE
Status: COMPLETED | OUTPATIENT
Start: 2025-07-16 | End: 2025-07-16

## 2025-07-16 RX ORDER — METHYLPREDNISOLONE 4 MG/1
TABLET ORAL
Qty: 21 EACH | Refills: 0 | Status: SHIPPED | OUTPATIENT
Start: 2025-07-16

## 2025-07-16 RX ADMIN — METHYLPREDNISOLONE SODIUM SUCCINATE 62.5 MG: 125 INJECTION, POWDER, FOR SOLUTION INTRAMUSCULAR; INTRAVENOUS at 08:39

## 2025-07-16 RX ADMIN — IPRATROPIUM BROMIDE AND ALBUTEROL SULFATE 3 ML: .5; 2.5 SOLUTION RESPIRATORY (INHALATION) at 08:30

## 2025-07-16 ASSESSMENT — FIBROSIS 4 INDEX: FIB4 SCORE: 0.58

## 2025-07-16 NOTE — ED NOTES
Assist RN: Pt ambulated with normal steady gait from ER lobby to pt room. Pt asked to dress in gown. BP cuff and pulse ox attached to cardiac monitor. Chart up for ERP.

## 2025-07-16 NOTE — ED PROVIDER NOTES
ED Provider Note    CHIEF COMPLAINT  Chief Complaint   Patient presents with    Shortness of Breath     Hx of COPD, worst over the last two months without improvement. Patient reports increased SOB this morning.        EXTERNAL RECORDS REVIEWED  Inpatient Notes previous admission 2/15/2025: Presented with shortness of breath, initially required BiPAP, transition to nasal cannula, treated with DuoNebs, albuterol, Solu-Medrol, ceftriaxone, azithromycin.    HPI/ROS      Kenzie Huber is a 54 y.o. female with history of COPD, tobacco use 1/4 pack/day, who presents for shortness of breath gradually worsening over the last 6 weeks, acutely worsening last night.  She states she has been using home nebulizers and inhalers with only partial relief.  Her worsening shortness of breath continued into this morning, so she presented for further evaluation.  She denies any recent flulike symptoms, including no cough, runny nose, sore throat, fevers, chills.  She denies chest pains, extremity pains, leg swelling.    Of note, she states she was discharged from the hospital this past month and was instructed to use home oxygen.  However, she states that she works as a  and is unable to use portable oxygen tanks while at work, so she rarely uses it.  She does keep a portable nebulizer with her while at work.    PAST MEDICAL HISTORY   has a past medical history of COPD (chronic obstructive pulmonary disease) (HCC).    SURGICAL HISTORY   has a past surgical history that includes tonsillectomy and adenoidectomy.    FAMILY HISTORY  History reviewed. No pertinent family history.    SOCIAL HISTORY  Social History     Tobacco Use    Smoking status: Every Day     Current packs/day: 0.50     Types: Cigarettes    Smokeless tobacco: Not on file    Tobacco comments:     1/2 pack a day.    Vaping Use    Vaping status: Never Used   Substance and Sexual Activity    Alcohol use: Never     Comment: Rarely    Drug use: Yes     Types: Inhaled  "    Comment: THC CBD    Sexual activity: Not on file       CURRENT MEDICATIONS  Home Medications    **Home medications have not yet been reviewed for this encounter**         ALLERGIES  Allergies[1]    PHYSICAL EXAM  VITAL SIGNS: /74   Pulse 63   Temp 36.3 °C (97.4 °F) (Temporal)   Resp 16   Ht 1.676 m (5' 6\")   Wt 49.8 kg (109 lb 12.6 oz)   SpO2 97%   BMI 17.72 kg/m²          Pulse ox interpretation: I interpret this pulse ox as normal.  Constitutional: Alert in no apparent distress.  Sitting up in ER [facility] using telephone.  HENT: No signs of trauma, Bilateral external ears normal, Nose normal.   Eyes: Pupils are equal and reactive, Conjunctiva normal, Non-icteric.   Neck: Normal range of motion, No tenderness, Supple, No stridor.   Lymphatic: No lymphadenopathy noted.   Cardiovascular: Regular rate and rhythm, no murmurs.   Thorax & Lungs: No respiratory distress.  Diffuse jenaro wheezes and rales bilaterally.  Prolonged expiratory phase.  Abdomen: Soft, No tenderness, No masses, No pulsatile masses. No peritoneal signs.  Skin: Warm, Dry, No erythema, No rash.   Back: No bony tenderness, No CVA tenderness.   Extremities: Intact distal pulses, No edema, No tenderness, No cyanosis,  Negative Jagjit's sign.   Musculoskeletal: Good range of motion in all major joints. No tenderness to palpation or major deformities noted.   Neurologic: Alert , Normal motor function, Normal sensory function, No focal deficits noted.   Psychiatric: Affect normal, Judgment normal, Mood normal.     EKG/LABS  Results for orders placed or performed during the hospital encounter of 07/16/25   CBC with Differential    Collection Time: 07/16/25  8:10 AM   Result Value Ref Range    WBC 5.6 4.8 - 10.8 K/uL    RBC 4.59 4.20 - 5.40 M/uL    Hemoglobin 14.0 12.0 - 16.0 g/dL    Hematocrit 42.8 37.0 - 47.0 %    MCV 93.2 81.4 - 97.8 fL    MCH 30.5 27.0 - 33.0 pg    MCHC 32.7 32.2 - 35.5 g/dL    RDW 47.8 35.9 - 50.0 fL    Platelet Count 324 " 164 - 446 K/uL    MPV 10.4 9.0 - 12.9 fL    Neutrophils-Polys 47.30 44.00 - 72.00 %    Lymphocytes 33.80 22.00 - 41.00 %    Monocytes 6.60 0.00 - 13.40 %    Eosinophils 10.50 (H) 0.00 - 6.90 %    Basophils 1.40 0.00 - 1.80 %    Immature Granulocytes 0.40 0.00 - 0.90 %    Nucleated RBC 0.00 0.00 - 0.20 /100 WBC    Neutrophils (Absolute) 2.66 1.82 - 7.42 K/uL    Lymphs (Absolute) 1.90 1.00 - 4.80 K/uL    Monos (Absolute) 0.37 0.00 - 0.85 K/uL    Eos (Absolute) 0.59 (H) 0.00 - 0.51 K/uL    Baso (Absolute) 0.08 0.00 - 0.12 K/uL    Immature Granulocytes (abs) 0.02 0.00 - 0.11 K/uL    NRBC (Absolute) 0.00 K/uL   Comp Metabolic Panel    Collection Time: 07/16/25  8:10 AM   Result Value Ref Range    Sodium 142 135 - 145 mmol/L    Potassium 3.7 3.6 - 5.5 mmol/L    Chloride 109 96 - 112 mmol/L    Co2 21 20 - 33 mmol/L    Anion Gap 12.0 7.0 - 16.0    Glucose 96 65 - 99 mg/dL    Bun 14 8 - 22 mg/dL    Creatinine 0.74 0.50 - 1.40 mg/dL    Calcium 8.4 (L) 8.5 - 10.5 mg/dL    Correct Calcium 8.5 8.5 - 10.5 mg/dL    AST(SGOT) 18 12 - 45 U/L    ALT(SGPT) 25 2 - 50 U/L    Alkaline Phosphatase 87 30 - 99 U/L    Total Bilirubin 0.2 0.1 - 1.5 mg/dL    Albumin 3.9 3.2 - 4.9 g/dL    Total Protein 6.5 6.0 - 8.2 g/dL    Globulin 2.6 1.9 - 3.5 g/dL    A-G Ratio 1.5 g/dL   proBrain Natriuretic Peptide, NT    Collection Time: 07/16/25  8:10 AM   Result Value Ref Range    NT-proBNP 332 (H) 0 - 125 pg/mL   Troponin    Collection Time: 07/16/25  8:10 AM   Result Value Ref Range    Troponin T <6 6 - 19 ng/L   MAGNESIUM    Collection Time: 07/16/25  8:10 AM   Result Value Ref Range    Magnesium 1.9 1.5 - 2.5 mg/dL   ESTIMATED GFR    Collection Time: 07/16/25  8:10 AM   Result Value Ref Range    GFR (CKD-EPI) 96 >60 mL/min/1.73 m 2   EKG    Collection Time: 07/16/25  9:32 AM   Result Value Ref Range    Report       [facility] Emergency Dept.    Test Date:  2025-07-16  Pt Name:    GLORY ARLIN            Department: ER  MRN:         [id number]                      Room:  Gender:     Female                       Technician: [id number]  :        1970                   Requested By:ER TRIAGE PROTOCOL  Order #:    [id number]                    Reading MD: Juve iNx    Measurements  Intervals                                Axis  Rate:       70                           P:          84  CA:         139                          QRS:        88  QRSD:       88                           T:          57  QT:         413  QTc:        446    Interpretive Statements  Sinus rhythm  Probable left atrial enlargement  Compared to ECG 02/15/2025 20:46:02  Intraventricular conduction delay no longer present  Electronically Signed On 2025 09:32:11 PDT by Juve Nix         Normal sinus rhythm, rate 70.  Enlarged P waves, normal CA interval, normal QRS, normal QTc.  Normal ST segments  I have independently interpreted this EKG    RADIOLOGY/PROCEDURES   I have independently interpreted the diagnostic imaging associated with this visit and am waiting the final reading from the radiologist.   My preliminary interpretation is as follows: CXR shows expanded airspaces, otherwise no acute process      Radiologist interpretation:  DX-CHEST-PORTABLE (1 VIEW)   Final Result      1.  No acute cardiac or pulmonary abnormalities are identified.   2.  Nodular opacities of the bilateral midlungs, likely nipple shadows. Consider repeat exam with nipple markers.          COURSE & MEDICAL DECISION MAKING    ASSESSMENT, COURSE AND PLAN  Care Narrative: 54-year-old female with history of COPD, daily tobacco use 1/4 pack/day, presenting with shortness of breath gradually worsening over the past 6 weeks, acutely worsening overnight.  Reports using home nebulizers and inhalers without improvement.  Normal room air saturations on arrival, though there is diffuse wheezing and prolonged expiratory phase on initial exam.      0823: CXR shows expanded airspaces, otherwise no  acute process.  Ordered CBC, CMP, BNP, troponin, magnesium.  Medicated with DuoNeb, Solu-Medrol.    0922: CBC, CMP, troponin, magnesium all grossly normal.  BNP slightly elevated 332.  She does not appear fluid overloaded, no signs of peripheral edema or crackles on exam. Patient feeling improved after nebulizer treatment.  She feels comfortable with discharge home with Medrol Dosepak and outpatient follow-up.  Placed referral to establish with PCP outpatient.            DISPOSITION AND DISCUSSIONS  I have discussed management of the patient with the following physicians and ANA LUISA's: None    Discussion of management with other QHP or appropriate source(s): None     Escalation of care considered, and ultimately not performed:acute inpatient care management, however at this time, the patient is most appropriate for outpatient management    Barriers to care at this time, including but not limited to: Patient does not have established PCP.     Decision tools and prescription drugs considered including, but not limited to: Antibiotics considered for possible pneumonia.  However, patient's evaluation is less consistent with bacterial infection, more likely COPD exacerbation should be treated with course of steroids..    FINAL DIAGNOSIS  1. Acute exacerbation of chronic obstructive pulmonary disease (COPD) ([facility])      Jame Ellington M.D.   PGY-3  [facility]       Electronically signed by: Juve Nix M.D., 7/16/2025 8:22 AM           [1]   Allergies  Allergen Reactions    Naproxen Anaphylaxis    Pcn [Penicillins] Rash     > 30 years ago